# Patient Record
Sex: FEMALE | Race: ASIAN | Employment: FULL TIME | URBAN - METROPOLITAN AREA
[De-identification: names, ages, dates, MRNs, and addresses within clinical notes are randomized per-mention and may not be internally consistent; named-entity substitution may affect disease eponyms.]

---

## 2023-02-08 ENCOUNTER — HOSPITAL ENCOUNTER (EMERGENCY)
Age: 23
Discharge: HOME | End: 2023-02-08

## 2023-02-08 VITALS
RESPIRATION RATE: 16 BRPM | HEIGHT: 64 IN | OXYGEN SATURATION: 99 % | BODY MASS INDEX: 16.22 KG/M2 | DIASTOLIC BLOOD PRESSURE: 81 MMHG | HEART RATE: 92 BPM | SYSTOLIC BLOOD PRESSURE: 114 MMHG | WEIGHT: 95 LBS | TEMPERATURE: 97.8 F

## 2023-02-08 NOTE — ED TRIAGE NOTES
Patient co petechia rash to legs and torso as well as blisters to bottom lip.   Pt was seen 02/03/23

## 2023-02-09 ENCOUNTER — HOSPITAL ENCOUNTER (INPATIENT)
Age: 23
LOS: 3 days | Discharge: HOME | DRG: 813 | End: 2023-02-12
Attending: EMERGENCY MEDICINE | Admitting: FAMILY MEDICINE
Payer: COMMERCIAL

## 2023-02-09 ENCOUNTER — APPOINTMENT (OUTPATIENT)
Dept: GENERAL RADIOLOGY | Age: 23
DRG: 813 | End: 2023-02-09
Payer: COMMERCIAL

## 2023-02-09 DIAGNOSIS — D69.6 THROMBOCYTOPENIA (HCC): Primary | ICD-10-CM

## 2023-02-09 LAB
ABO + RH BLD: NORMAL
ALBUMIN SERPL-MCNC: 3.9 G/DL (ref 3.5–5)
ALBUMIN/GLOB SERPL: 0.8 (ref 0.4–1.6)
ALP SERPL-CCNC: 68 U/L (ref 50–136)
ALT SERPL-CCNC: 23 U/L (ref 12–65)
ANION GAP SERPL CALC-SCNC: 5 MMOL/L (ref 2–11)
AST SERPL-CCNC: 19 U/L (ref 15–37)
BASOPHILS # BLD: 0 K/UL (ref 0–0.2)
BASOPHILS NFR BLD: 0 % (ref 0–2)
BILIRUB SERPL-MCNC: 0.7 MG/DL (ref 0.2–1.1)
BLOOD GROUP ANTIBODIES SERPL: NORMAL
BLOOD GROUP ANTIBODIES SERPL: NORMAL
BUN SERPL-MCNC: 9 MG/DL (ref 6–23)
CALCIUM SERPL-MCNC: 9.3 MG/DL (ref 8.3–10.4)
CHLORIDE SERPL-SCNC: 104 MMOL/L (ref 101–110)
CO2 SERPL-SCNC: 28 MMOL/L (ref 21–32)
CREAT SERPL-MCNC: 0.67 MG/DL (ref 0.6–1)
DAT POLY-SP REAG RBC QL: NORMAL
DIFFERENTIAL METHOD BLD: ABNORMAL
EOSINOPHIL # BLD: 0.1 K/UL (ref 0–0.8)
EOSINOPHIL NFR BLD: 2 % (ref 0.5–7.8)
ERYTHROCYTE [DISTWIDTH] IN BLOOD BY AUTOMATED COUNT: 12.7 % (ref 11.9–14.6)
GLOBULIN SER CALC-MCNC: 4.7 G/DL (ref 2.8–4.5)
GLUCOSE SERPL-MCNC: 98 MG/DL (ref 65–100)
HCT VFR BLD AUTO: 36.6 % (ref 35.8–46.3)
HGB BLD-MCNC: 12.3 G/DL (ref 11.7–15.4)
IMM GRANULOCYTES # BLD AUTO: 0 K/UL (ref 0–0.5)
IMM GRANULOCYTES NFR BLD AUTO: 0 % (ref 0–5)
INR PPP: 0.9
LDH SERPL L TO P-CCNC: 247 U/L (ref 100–190)
LIPASE SERPL-CCNC: 142 U/L (ref 73–393)
LYMPHOCYTES # BLD: 2.3 K/UL (ref 0.5–4.6)
LYMPHOCYTES NFR BLD: 37 % (ref 13–44)
MCH RBC QN AUTO: 27.2 PG (ref 26.1–32.9)
MCHC RBC AUTO-ENTMCNC: 33.6 G/DL (ref 31.4–35)
MCV RBC AUTO: 80.8 FL (ref 82–102)
MONOCYTES # BLD: 0.4 K/UL (ref 0.1–1.3)
MONOCYTES NFR BLD: 7 % (ref 4–12)
NEUTS SEG # BLD: 3.3 K/UL (ref 1.7–8.2)
NEUTS SEG NFR BLD: 54 % (ref 43–78)
NRBC # BLD: 0 K/UL (ref 0–0.2)
PLATELET # BLD AUTO: 2 K/UL (ref 150–450)
PMV BLD AUTO: ABNORMAL FL (ref 9.4–12.3)
POTASSIUM SERPL-SCNC: 3.5 MMOL/L (ref 3.5–5.1)
PROT SERPL-MCNC: 8.6 G/DL (ref 6.3–8.2)
PROTHROMBIN TIME: 12.4 SEC (ref 12.6–14.3)
RBC # BLD AUTO: 4.53 M/UL (ref 4.05–5.2)
SODIUM SERPL-SCNC: 137 MMOL/L (ref 133–143)
SPECIMEN EXP DATE BLD: NORMAL
TROPONIN I SERPL HS-MCNC: 4 PG/ML (ref 0–14)
WBC # BLD AUTO: 6.2 K/UL (ref 4.3–11.1)

## 2023-02-09 PROCEDURE — 80053 COMPREHEN METABOLIC PANEL: CPT

## 2023-02-09 PROCEDURE — 2580000003 HC RX 258: Performed by: FAMILY MEDICINE

## 2023-02-09 PROCEDURE — 74022 RADEX COMPL AQT ABD SERIES: CPT

## 2023-02-09 PROCEDURE — 85025 COMPLETE CBC W/AUTO DIFF WBC: CPT

## 2023-02-09 PROCEDURE — 83615 LACTATE (LD) (LDH) ENZYME: CPT

## 2023-02-09 PROCEDURE — P9035 PLATELET PHERES LEUKOREDUCED: HCPCS

## 2023-02-09 PROCEDURE — 86880 COOMBS TEST DIRECT: CPT

## 2023-02-09 PROCEDURE — 30233R1 TRANSFUSION OF NONAUTOLOGOUS PLATELETS INTO PERIPHERAL VEIN, PERCUTANEOUS APPROACH: ICD-10-PCS | Performed by: FAMILY MEDICINE

## 2023-02-09 PROCEDURE — 1100000000 HC RM PRIVATE

## 2023-02-09 PROCEDURE — 94760 N-INVAS EAR/PLS OXIMETRY 1: CPT

## 2023-02-09 PROCEDURE — 86901 BLOOD TYPING SEROLOGIC RH(D): CPT

## 2023-02-09 PROCEDURE — 83690 ASSAY OF LIPASE: CPT

## 2023-02-09 PROCEDURE — 84484 ASSAY OF TROPONIN QUANT: CPT

## 2023-02-09 PROCEDURE — 86870 RBC ANTIBODY IDENTIFICATION: CPT

## 2023-02-09 PROCEDURE — 85610 PROTHROMBIN TIME: CPT

## 2023-02-09 PROCEDURE — 93005 ELECTROCARDIOGRAM TRACING: CPT | Performed by: STUDENT IN AN ORGANIZED HEALTH CARE EDUCATION/TRAINING PROGRAM

## 2023-02-09 PROCEDURE — 99285 EMERGENCY DEPT VISIT HI MDM: CPT | Performed by: EMERGENCY MEDICINE

## 2023-02-09 RX ORDER — ONDANSETRON 2 MG/ML
4 INJECTION INTRAMUSCULAR; INTRAVENOUS EVERY 6 HOURS PRN
Status: DISCONTINUED | OUTPATIENT
Start: 2023-02-09 | End: 2023-02-12 | Stop reason: HOSPADM

## 2023-02-09 RX ORDER — POLYETHYLENE GLYCOL 3350 17 G/17G
17 POWDER, FOR SOLUTION ORAL DAILY PRN
Status: DISCONTINUED | OUTPATIENT
Start: 2023-02-09 | End: 2023-02-12 | Stop reason: HOSPADM

## 2023-02-09 RX ORDER — SODIUM CHLORIDE 9 MG/ML
INJECTION, SOLUTION INTRAVENOUS PRN
Status: DISCONTINUED | OUTPATIENT
Start: 2023-02-09 | End: 2023-02-12 | Stop reason: HOSPADM

## 2023-02-09 RX ORDER — ACETAMINOPHEN 650 MG/1
650 SUPPOSITORY RECTAL EVERY 6 HOURS PRN
Status: DISCONTINUED | OUTPATIENT
Start: 2023-02-09 | End: 2023-02-12 | Stop reason: HOSPADM

## 2023-02-09 RX ORDER — ACETAMINOPHEN 325 MG/1
650 TABLET ORAL EVERY 6 HOURS PRN
Status: DISCONTINUED | OUTPATIENT
Start: 2023-02-09 | End: 2023-02-12 | Stop reason: HOSPADM

## 2023-02-09 RX ORDER — SODIUM CHLORIDE 0.9 % (FLUSH) 0.9 %
5-40 SYRINGE (ML) INJECTION EVERY 12 HOURS SCHEDULED
Status: DISCONTINUED | OUTPATIENT
Start: 2023-02-09 | End: 2023-02-12 | Stop reason: HOSPADM

## 2023-02-09 RX ORDER — SODIUM CHLORIDE 0.9 % (FLUSH) 0.9 %
5-40 SYRINGE (ML) INJECTION PRN
Status: DISCONTINUED | OUTPATIENT
Start: 2023-02-09 | End: 2023-02-12 | Stop reason: HOSPADM

## 2023-02-09 RX ORDER — ONDANSETRON 4 MG/1
4 TABLET, ORALLY DISINTEGRATING ORAL EVERY 8 HOURS PRN
Status: DISCONTINUED | OUTPATIENT
Start: 2023-02-09 | End: 2023-02-12 | Stop reason: HOSPADM

## 2023-02-09 RX ADMIN — SODIUM CHLORIDE, PRESERVATIVE FREE 10 ML: 5 INJECTION INTRAVENOUS at 23:32

## 2023-02-09 ASSESSMENT — ENCOUNTER SYMPTOMS
RHINORRHEA: 0
PHOTOPHOBIA: 0
CHEST TIGHTNESS: 0
EYE DISCHARGE: 0
APNEA: 0
VOICE CHANGE: 0
SORE THROAT: 0
TROUBLE SWALLOWING: 0
EYE PAIN: 0
FACIAL SWELLING: 0
SHORTNESS OF BREATH: 1
WHEEZING: 0
ABDOMINAL PAIN: 1
EYE REDNESS: 0
COUGH: 0
NAUSEA: 1
DIARRHEA: 1

## 2023-02-09 ASSESSMENT — PAIN SCALES - GENERAL
PAINLEVEL_OUTOF10: 0
PAINLEVEL_OUTOF10: 7

## 2023-02-09 ASSESSMENT — PAIN DESCRIPTION - LOCATION: LOCATION: ABDOMEN

## 2023-02-09 ASSESSMENT — PAIN - FUNCTIONAL ASSESSMENT: PAIN_FUNCTIONAL_ASSESSMENT: 0-10

## 2023-02-09 NOTE — ED PROVIDER NOTES
Emergency Department Provider Note                   PCP:                None None               Age: 23 y.o. Sex: female       ICD-10-CM    1. Thrombocyt[facility] ([facility]  D69.6           DISPOSITION Decision To Admit 02/09/2023 08:10:07 PM  ===================================  ED EKG Interpretation  EKG was interpreted in the absence of a cardiologist.    Rate: 72  EKG Interpretation: EKG Interpretation: sinus rhythm  ST Segments: Normal ST segments - NO STEMI    MARIIA Sanches 8:14 PM         Medical Decision Making  72-year-old female patient who presented today for reevaluation of generalized abdominal pain, nausea, vomiting, diarrhea. New onset of bilateral petechial rash with blistering in the mouth as well as recurrent epistasis. Work-up today was remarkable for platelet count of 2 as well as elevated LDH. Kidney function stable. Borderline febrile. Generalized abdominal tenderness however no focal tenderness. Did not feel that repeat CT imaging was warranted given past negative CT and improvement of symptoms overall. Platelets were ordered for the patient. Differential includes TTP versus ITP versus HUS, etc. consulted hospitalist Dr. Jonathon Judd to get the patient admitted. She is agreeable. Patient history, ROS, physical exam, labs, radiological workup and all pertinent findings were discussed with attending Stefany Poole MD              I have conducted an independent ordering and review of Labs. I have conducted an independent ordering and review of EKG. I have conducted an independent ordering and review of X-rays. I have reviewed records from an external source: ED records from outside this hospital.  Considerations: Shared decision making was utilized in the care of this patient. Considerations: The following labs and/or imaging studies were considered but not ordered: CT abdomen pelvis  Considerations:  The following treatments were considered but not given: Rx such as antibiotics. Considerations: Hospitalization was considered. I discussed study results with another external provider. I discussed disposition with another provider. Patient was admitted I have communication with admitting physician. I have had a discussion with external consultants. ED Course as of 02/09/23 2014 Thu Feb 09, 2023 1940 7:40 PM  Platelet count decreased to 2. Catha Pia my attending physician Dr. Leanna Arzate. We will transfuse platelets. Discussed this with the patient. Consent obtained [NR]   2008 8:08 PM  Consulted Dr. Moreno Talley hospitalist to get patient admitted. [NR]      ED Course User Index  [NR] MARIIA Herrera        Orders Placed This Encounter   Procedures    XR ACUTE ABD SERIES CHEST 1 VW    CBC with Diff    CMP    Lipase    Protime-INR    Lactate Dehydrogenase    Troponin    CBC    Diet NPO    POCT Urine Dipstick    Verify hospital blood product consent form has been signed and witnessed    Vital Signs For Blood Product Transfusion    Transfusion Reaction Management    POC Pregnancy Urine Qual    EKG 12 Lead    TYPE AND SCREEN    PREPARE PLATELETS, 1 Product    Saline lock IV        Medications   0.9 % sodium chloride infusion (has no administration in time range)       New Prescriptions    No medications on file        Javed Ernandez is a 23 y.o. female who presents to the Emergency Department with chief complaint of    Chief Complaint   Patient presents with    Rash    Epistaxis      63-year-old female patient with no significant reported past medical history presents today for reevaluation. She reports for the past week she has been having nausea and vomiting and diarrhea as well as diffuse abdominal pain. Reports has been to both an urgent care and to our ED without clear cause. Suspected possible viral syndrome. She reports 2 days ago she noticed that she had a rash to her bilateral lower extremities that has seemed to have been worsening each day.   She reports it looks like small little spots that are red. She also reports that for the past 2 days she has been waking up with nosebleeds and having nosebleeds throughout the day. She has also noticed blisters appearing on her lips and inside of her mouth. She states she still has the generalized abdominal pain as well as chest pain and shortness of breath but states that it is improving each day. She denies any fevers. Denies dysuria or hematuria. Denies blood in her stool or melena. Denies skin sloughing. Patient is primary historian quality is reliable. The history is provided by the patient. No  was used. Review of Systems   Constitutional:  Negative for fatigue and fever. HENT:  Positive for mouth sores. Negative for congestion, ear pain, facial swelling, hearing loss, rhinorrhea, sore throat, trouble swallowing and voice change. Eyes:  Negative for photophobia, pain, discharge, redness and visual disturbance. Respiratory:  Positive for shortness of breath. Negative for apnea, cough, chest tightness and wheezing. Cardiovascular:  Positive for chest pain. Negative for palpitations. Gastrointestinal:  Positive for abdominal pain, diarrhea and nausea. Endocrine: Negative for polydipsia, polyphagia and polyuria. Genitourinary:  Negative for decreased urine volume, dysuria, flank pain, frequency and hematuria. Musculoskeletal:  Negative for arthralgias, joint swelling, myalgias and neck stiffness. Skin:  Positive for pallor. Negative for rash and wound. Neurological:  Negative for dizziness, syncope, speech difficulty, weakness, light-headedness and headaches. Hematological:  Does not bruise/bleed easily. Psychiatric/Behavioral:  Negative for self-injury and suicidal ideas. All other systems reviewed and are negative. History reviewed. No pertinent past medical history. History reviewed. No pertinent surgical history. History reviewed.  No pertinent family history. Social History     Socioeconomic History    Marital status: Single     Spouse name: None    Number of children: None    Years of education: None    Highest education level: None   Tobacco Use    Smoking status: Never    Smokeless tobacco: Never   Substance and Sexual Activity    Alcohol use: Never    Drug use: Never         Patient has no known allergies. Previous Medications    No medications on file        Vitals signs and nursing note reviewed. Patient Vitals for the past 4 hrs:   Temp Pulse Resp BP SpO2   02/09/23 1802 99.7 °F (37.6 °C) 82 16 121/72 100 %          Physical Exam  Vitals and nursing note reviewed. Constitutional:       General: She is not in acute distress. Appearance: Normal appearance. She is not ill-appearing, toxic-appearing or diaphoretic. HENT:      Head: Normocephalic and atraumatic. Right Ear: External ear normal.      Left Ear: External ear normal.      Nose: Nose normal.      Mouth/Throat:      Mouth: Mucous membranes are moist.      Comments: Multiple blisters in mouth as shown in photos. Eyes:      General: No scleral icterus. Right eye: No discharge. Left eye: No discharge. Conjunctiva/sclera: Conjunctivae normal.   Cardiovascular:      Rate and Rhythm: Normal rate and regular rhythm. Pulses: Normal pulses. Heart sounds: Normal heart sounds. Comments: No chest wall tenderness  Pulmonary:      Effort: Pulmonary effort is normal. No respiratory distress. Breath sounds: Normal breath sounds. No stridor. No wheezing, rhonchi or rales. Abdominal:      General: There is no distension. Palpations: There is no mass. Tenderness: There is abdominal tenderness. There is no right CVA tenderness, left CVA tenderness, guarding or rebound. Hernia: No hernia is present.       Comments: Diffuse generalized abdominal tenderness without focal tenderness or peritoneal signs   Musculoskeletal:         General: Normal range of motion. Cervical back: Normal range of motion and neck supple. Skin:     General: Skin is warm and dry. Capillary Refill: Capillary refill takes less than 2 seconds. Coloration: Skin is not jaundiced. Findings: Rash present. Comments: Petechial rash present to bilateral lower extremities and abdomen as shown in photos   Neurological:      General: No focal deficit present. Mental Status: She is alert and oriented to person, place, and time. Mental status is at baseline. Procedures      Results for orders placed or performed during the hospital encounter of 02/09/23   XR ACUTE ABD SERIES CHEST 1 VW    Narrative    EXAMINATION: Single view chest and three-view abdomen    DATE: 2/9/2023 7:00 PM    COMPARISON: None    CLINICAL HISTORY: Chest and abdominal pain    FINDINGS:    Costophrenic angles are clear. Heart size is normal.  Pulmonary vasculature is not distended. There are no dense regions of   consolidation. No free air identified in the abdomen. No dilated loops of large and small   bowel. Moderate amount retained stool within the descending and distal colon. Impression    No acute cardiopulmonary process. Moderate amount of retained stool in the colon. Nonobstructive bowel gas pattern    Elian Solomon M.D.   2/9/2023 7:35:00 PM   CBC with Diff   Result Value Ref Range    WBC 6.2 4.3 - 11.1 K/uL    RBC 4.53 4.05 - 5.2 M/uL    Hemoglobin 12.3 11.7 - 15.4 g/dL    Hematocrit 36.6 35.8 - 46.3 %    MCV 80.8 (L) 82.0 - 102.0 FL    MCH 27.2 26.1 - 32.9 PG    MCHC 33.6 31.4 - 35.0 g/dL    RDW 12.7 11.9 - 14.6 %    Platelets 2 (LL) 714 - 450 K/uL    MPV Unable to calculate. Recommend adding IPF.  9.4 - 12.3 FL    nRBC 0.00 0.0 - 0.2 K/uL    Differential Type AUTOMATED      Seg Neutrophils 54 43 - 78 %    Lymphocytes 37 13 - 44 %    Monocytes 7 4.0 - 12.0 %    Eosinophils % 2 0.5 - 7.8 %    Basophils 0 0.0 - 2.0 %    Immature Granulocytes 0 0.0 - 5.0 %    Segs Absolute 3.3 1.7 - 8.2 K/UL    Absolute Lymph # 2.3 0.5 - 4.6 K/UL    Absolute Mono # 0.4 0.1 - 1.3 K/UL    Absolute Eos # 0.1 0.0 - 0.8 K/UL    Basophils Absolute 0.0 0.0 - 0.2 K/UL    Absolute Immature Granulocyte 0.0 0.0 - 0.5 K/UL   CMP   Result Value Ref Range    Sodium 137 133 - 143 mmol/L    Potassium 3.5 3.5 - 5.1 mmol/L    Chloride 104 101 - 110 mmol/L    CO2 28 21 - 32 mmol/L    Anion Gap 5 2 - 11 mmol/L    Glucose 98 65 - 100 mg/dL    BUN 9 6 - 23 MG/DL    Creatinine 0.67 0.6 - 1.0 MG/DL    Est, Glom Filt Rate >60 >60 ml/min/1.73m2    Calcium 9.3 8.3 - 10.4 MG/DL    Total Bilirubin 0.7 0.2 - 1.1 MG/DL    ALT 23 12 - 65 U/L    AST 19 15 - 37 U/L    Alk Phosphatase 68 50 - 136 U/L    Total Protein 8.6 (H) 6.3 - 8.2 g/dL    Albumin 3.9 3.5 - 5.0 g/dL    Globulin 4.7 (H) 2.8 - 4.5 g/dL    Albumin/Globulin Ratio 0.8 0.4 - 1.6     Lipase   Result Value Ref Range    Lipase 142 73 - 393 U/L   Protime-INR   Result Value Ref Range    Protime 12.4 (L) 12.6 - 14.3 sec    INR 0.9     Lactate Dehydrogenase   Result Value Ref Range     (H) 100 - 190 U/L   Troponin   Result Value Ref Range    Troponin, High Sensitivity 4.0 0 - 14 pg/mL   TYPE AND SCREEN   Result Value Ref Range    Crossmatch expiration date 02/12/2023,2359     ABO/Rh B POSITIVE     Antibody Screen PENDING         XR ACUTE ABD SERIES CHEST 1 VW   Final Result      No acute cardiopulmonary process. Moderate amount of retained stool in the colon. Nonobstructive bowel gas pattern      Elian Gupta M.D.    2/9/2023 7:35:00 PM                          Voice dictation software was used during the making of this note. This software is not perfect and grammatical and other typographical errors may be present. This note has not been completely proofread for errors.      Evelio Mattama  02/09/23 2014

## 2023-02-09 NOTE — ED TRIAGE NOTES
Patient reports rash to body and legs, bleeding gums and epistaxis. Patient reports abd pain, but states that is improving.

## 2023-02-10 PROBLEM — D61.818 PANCYTOPENIA (HCC): Status: ACTIVE | Noted: 2023-02-10

## 2023-02-10 PROBLEM — R04.0 EPISTAXIS: Status: ACTIVE | Noted: 2023-02-10

## 2023-02-10 LAB
ANION GAP SERPL CALC-SCNC: 4 MMOL/L (ref 2–11)
APTT PPP: 75 SEC (ref 24.5–34.2)
BASOPHILS # BLD: 0 K/UL (ref 0–0.2)
BASOPHILS NFR BLD: 0 % (ref 0–2)
BLD PROD TYP BPU: NORMAL
BLOOD BANK DISPENSE STATUS: NORMAL
BPU ID: NORMAL
BUN SERPL-MCNC: 9 MG/DL (ref 6–23)
CALCIUM SERPL-MCNC: 9.1 MG/DL (ref 8.3–10.4)
CHLORIDE SERPL-SCNC: 110 MMOL/L (ref 101–110)
CO2 SERPL-SCNC: 26 MMOL/L (ref 21–32)
CREAT SERPL-MCNC: 0.62 MG/DL (ref 0.6–1)
D DIMER PPP FEU-MCNC: 1.09 UG/ML(FEU)
DIFFERENTIAL METHOD BLD: ABNORMAL
EKG ATRIAL RATE: 72 BPM
EKG DIAGNOSIS: NORMAL
EKG P AXIS: 55 DEGREES
EKG P-R INTERVAL: 156 MS
EKG Q-T INTERVAL: 423 MS
EKG QRS DURATION: 72 MS
EKG QTC CALCULATION (BAZETT): 463 MS
EKG R AXIS: 79 DEGREES
EKG T AXIS: 56 DEGREES
EKG VENTRICULAR RATE: 72 BPM
EOSINOPHIL # BLD: 0.1 K/UL (ref 0–0.8)
EOSINOPHIL NFR BLD: 3 % (ref 0.5–7.8)
ERYTHROCYTE [DISTWIDTH] IN BLOOD BY AUTOMATED COUNT: 12.6 % (ref 11.9–14.6)
ERYTHROCYTE [DISTWIDTH] IN BLOOD BY AUTOMATED COUNT: 12.6 % (ref 11.9–14.6)
ERYTHROCYTE [DISTWIDTH] IN BLOOD BY AUTOMATED COUNT: 12.7 % (ref 11.9–14.6)
FERRITIN SERPL-MCNC: 29 NG/ML (ref 8–388)
FIBRINOGEN PPP-MCNC: 294 MG/DL (ref 190–501)
FOLATE SERPL-MCNC: 11.6 NG/ML (ref 3.1–17.5)
GLUCOSE SERPL-MCNC: 101 MG/DL (ref 65–100)
HAPTOGLOB SERPL-MCNC: 135 MG/DL (ref 30–200)
HCT VFR BLD AUTO: 34.2 % (ref 35.8–46.3)
HCT VFR BLD AUTO: 34.2 % (ref 35.8–46.3)
HCT VFR BLD AUTO: 34.9 % (ref 35.8–46.3)
HGB BLD-MCNC: 11.5 G/DL (ref 11.7–15.4)
HGB BLD-MCNC: 11.5 G/DL (ref 11.7–15.4)
HGB BLD-MCNC: 11.6 G/DL (ref 11.7–15.4)
HGB RETIC QN AUTO: 33 PG (ref 29–35)
IMM GRANULOCYTES # BLD AUTO: 0 K/UL (ref 0–0.5)
IMM GRANULOCYTES NFR BLD AUTO: 0 % (ref 0–5)
IMM RETICS NFR: 5.6 % (ref 3–15.9)
INR PPP: 1
IRON SATN MFR SERPL: 15 %
IRON SERPL-MCNC: 50 UG/DL (ref 35–150)
LYMPHOCYTES # BLD: 1.4 K/UL (ref 0.5–4.6)
LYMPHOCYTES NFR BLD: 31 % (ref 13–44)
MCH RBC QN AUTO: 26.5 PG (ref 26.1–32.9)
MCH RBC QN AUTO: 26.9 PG (ref 26.1–32.9)
MCH RBC QN AUTO: 27.1 PG (ref 26.1–32.9)
MCHC RBC AUTO-ENTMCNC: 33.2 G/DL (ref 31.4–35)
MCHC RBC AUTO-ENTMCNC: 33.6 G/DL (ref 31.4–35)
MCHC RBC AUTO-ENTMCNC: 33.6 G/DL (ref 31.4–35)
MCV RBC AUTO: 79.9 FL (ref 82–102)
MCV RBC AUTO: 79.9 FL (ref 82–102)
MCV RBC AUTO: 80.5 FL (ref 82–102)
MONOCYTES # BLD: 0.2 K/UL (ref 0.1–1.3)
MONOCYTES NFR BLD: 4 % (ref 4–12)
NEUTS SEG # BLD: 2.8 K/UL (ref 1.7–8.2)
NEUTS SEG NFR BLD: 62 % (ref 43–78)
NRBC # BLD: 0 K/UL (ref 0–0.2)
PATH REV BLD -IMP: NORMAL
PLATELET # BLD AUTO: 2 K/UL (ref 150–450)
PLATELET # BLD AUTO: 24 K/UL (ref 150–450)
PLATELET # BLD AUTO: 38 K/UL (ref 150–450)
PMV BLD AUTO: 9.6 FL (ref 9.4–12.3)
PMV BLD AUTO: ABNORMAL FL (ref 9.4–12.3)
PMV BLD AUTO: ABNORMAL FL (ref 9.4–12.3)
POTASSIUM SERPL-SCNC: 4 MMOL/L (ref 3.5–5.1)
PROTHROMBIN TIME: 13.4 SEC (ref 12.6–14.3)
RBC # BLD AUTO: 4.25 M/UL (ref 4.05–5.2)
RBC # BLD AUTO: 4.28 M/UL (ref 4.05–5.2)
RBC # BLD AUTO: 4.37 M/UL (ref 4.05–5.2)
RETICS # AUTO: 0.04 M/UL (ref 0.03–0.1)
RETICS/RBC NFR AUTO: 0.9 % (ref 0.3–2)
SODIUM SERPL-SCNC: 140 MMOL/L (ref 133–143)
TIBC SERPL-MCNC: 329 UG/DL (ref 250–450)
UNIT DIVISION: 0
VIT B12 SERPL-MCNC: 519 PG/ML (ref 193–986)
WBC # BLD AUTO: 3.9 K/UL (ref 4.3–11.1)
WBC # BLD AUTO: 4.5 K/UL (ref 4.3–11.1)
WBC # BLD AUTO: 4.9 K/UL (ref 4.3–11.1)

## 2023-02-10 PROCEDURE — 36415 COLL VENOUS BLD VENIPUNCTURE: CPT

## 2023-02-10 PROCEDURE — 2580000003 HC RX 258: Performed by: NURSE PRACTITIONER

## 2023-02-10 PROCEDURE — 85730 THROMBOPLASTIN TIME PARTIAL: CPT

## 2023-02-10 PROCEDURE — 6370000000 HC RX 637 (ALT 250 FOR IP): Performed by: INTERNAL MEDICINE

## 2023-02-10 PROCEDURE — 85379 FIBRIN DEGRADATION QUANT: CPT

## 2023-02-10 PROCEDURE — 82784 ASSAY IGA/IGD/IGG/IGM EACH: CPT

## 2023-02-10 PROCEDURE — 1100000000 HC RM PRIVATE

## 2023-02-10 PROCEDURE — 83010 ASSAY OF HAPTOGLOBIN QUANT: CPT

## 2023-02-10 PROCEDURE — 85384 FIBRINOGEN ACTIVITY: CPT

## 2023-02-10 PROCEDURE — 6370000000 HC RX 637 (ALT 250 FOR IP): Performed by: FAMILY MEDICINE

## 2023-02-10 PROCEDURE — 85025 COMPLETE CBC W/AUTO DIFF WBC: CPT

## 2023-02-10 PROCEDURE — 36430 TRANSFUSION BLD/BLD COMPNT: CPT

## 2023-02-10 PROCEDURE — 6360000002 HC RX W HCPCS: Performed by: NURSE PRACTITIONER

## 2023-02-10 PROCEDURE — 82728 ASSAY OF FERRITIN: CPT

## 2023-02-10 PROCEDURE — 82746 ASSAY OF FOLIC ACID SERUM: CPT

## 2023-02-10 PROCEDURE — 85027 COMPLETE CBC AUTOMATED: CPT

## 2023-02-10 PROCEDURE — 85610 PROTHROMBIN TIME: CPT

## 2023-02-10 PROCEDURE — 85046 RETICYTE/HGB CONCENTRATE: CPT

## 2023-02-10 PROCEDURE — 80048 BASIC METABOLIC PNL TOTAL CA: CPT

## 2023-02-10 PROCEDURE — 82607 VITAMIN B-12: CPT

## 2023-02-10 PROCEDURE — 99222 1ST HOSP IP/OBS MODERATE 55: CPT | Performed by: INTERNAL MEDICINE

## 2023-02-10 PROCEDURE — 2580000003 HC RX 258: Performed by: FAMILY MEDICINE

## 2023-02-10 PROCEDURE — APPNB30 APP NON BILLABLE TIME 0-30 MINS: Performed by: NURSE PRACTITIONER

## 2023-02-10 PROCEDURE — 83550 IRON BINDING TEST: CPT

## 2023-02-10 RX ORDER — SODIUM CHLORIDE 9 MG/ML
INJECTION, SOLUTION INTRAVENOUS PRN
Status: DISCONTINUED | OUTPATIENT
Start: 2023-02-10 | End: 2023-02-12 | Stop reason: HOSPADM

## 2023-02-10 RX ORDER — POLYETHYLENE GLYCOL 3350 17 G/17G
17 POWDER, FOR SOLUTION ORAL DAILY
Status: DISCONTINUED | OUTPATIENT
Start: 2023-02-10 | End: 2023-02-12 | Stop reason: HOSPADM

## 2023-02-10 RX ORDER — DOCUSATE SODIUM 100 MG/1
100 CAPSULE, LIQUID FILLED ORAL 2 TIMES DAILY
Status: DISCONTINUED | OUTPATIENT
Start: 2023-02-10 | End: 2023-02-12 | Stop reason: HOSPADM

## 2023-02-10 RX ORDER — PANTOPRAZOLE SODIUM 40 MG/1
40 TABLET, DELAYED RELEASE ORAL
Status: DISCONTINUED | OUTPATIENT
Start: 2023-02-10 | End: 2023-02-12 | Stop reason: HOSPADM

## 2023-02-10 RX ORDER — PREDNISONE 50 MG/1
50 TABLET ORAL DAILY
Status: DISCONTINUED | OUTPATIENT
Start: 2023-02-10 | End: 2023-02-12 | Stop reason: HOSPADM

## 2023-02-10 RX ADMIN — ACETAMINOPHEN 650 MG: 325 TABLET, FILM COATED ORAL at 20:54

## 2023-02-10 RX ADMIN — PREDNISONE 50 MG: 50 TABLET ORAL at 22:27

## 2023-02-10 RX ADMIN — PANTOPRAZOLE SODIUM 40 MG: 40 TABLET, DELAYED RELEASE ORAL at 16:14

## 2023-02-10 RX ADMIN — SODIUM CHLORIDE 250 MG: 9 INJECTION, SOLUTION INTRAVENOUS at 16:15

## 2023-02-10 RX ADMIN — SODIUM CHLORIDE, PRESERVATIVE FREE 10 ML: 5 INJECTION INTRAVENOUS at 20:56

## 2023-02-10 RX ADMIN — SODIUM CHLORIDE, PRESERVATIVE FREE 10 ML: 5 INJECTION INTRAVENOUS at 09:13

## 2023-02-10 RX ADMIN — DOCUSATE SODIUM 100 MG: 100 CAPSULE ORAL at 20:54

## 2023-02-10 RX ADMIN — POLYETHYLENE GLYCOL 3350 17 G: 17 POWDER, FOR SOLUTION ORAL at 16:14

## 2023-02-10 RX ADMIN — ACETAMINOPHEN 650 MG: 325 TABLET, FILM COATED ORAL at 01:37

## 2023-02-10 RX ADMIN — IMMUNE GLOBULIN (HUMAN) 40 G: 10 INJECTION INTRAVENOUS; SUBCUTANEOUS at 18:30

## 2023-02-10 ASSESSMENT — PAIN DESCRIPTION - DESCRIPTORS: DESCRIPTORS: ACHING

## 2023-02-10 ASSESSMENT — PAIN SCALES - GENERAL
PAINLEVEL_OUTOF10: 3
PAINLEVEL_OUTOF10: 3
PAINLEVEL_OUTOF10: 2

## 2023-02-10 ASSESSMENT — PAIN DESCRIPTION - LOCATION
LOCATION: SHOULDER
LOCATION: HEAD

## 2023-02-10 NOTE — ASSESSMENT & PLAN NOTE
- Possibly due to recent viral gastroenteritis  - No other known medical history  - Petechiae covering BLE and oral palate as well  - No active bleeding  - Hgb 12.3 currently  - No anticoagulation  - Transfusing platelets now  - Will trend plts to keep above at least 20,000 as patient is not actively bleeding.   If patient starts to bleed, will need to keep at least >50,000  - Consult to Oncology

## 2023-02-10 NOTE — CARE COORDINATION
02/10/23 1145   Service Assessment   Patient Orientation Alert and Oriented   Cognition Alert   History Provided By Patient   Primary Caregiver Self   Support Systems Family Members   PCP Verified by CM Yes   Prior Functional Level Independent in ADLs/IADLs   Current Functional Level Independent in ADLs/IADLs   Can patient return to prior living arrangement Yes   Ability to make needs known: Good   Family able to assist with home care needs: Yes   Would you like for me to discuss the discharge plan with any other family members/significant others, and if so, who? Yes   Financial Resources Other (Comment)  (BCBS)   Community Resources None   Social/Functional History   Lives With Family; Other (comment)  (Mom, sister)   Type of [address] Equipment None   Home Equipment None   ADL Assistance Independent   Mode of Transportation Car   Discharge Planning   Type of Residence House   Living Arrangements Family Members   Current Services Prior To Admission None   Potential Assistance Needed N/A   DME Ordered? No   Type of Home Care Services None   [facility]   Transition of Care Consult (CM Consult) N/A   Services At/After Discharge None     RN CM met with the patient at the bedside and discussed discharge planning. The patient lives with family and is independent of ADLs. She does not use DMEs or external services. Her family will transport her home at discharge. She confirmed she has an appointment with a new PCP but her appointment isn't until May. According to the patient, her new PCP is Whitney Rios MD. The patient's appointment was discussed with the nursing staff. RN CM encouraged the patient to ask questions. Case Management will continue to follow this patient for discharge planning needs.

## 2023-02-10 NOTE — ED NOTES
Lab notified this RN platelet prep delayed d/t unavailability of B+ platelets at his facility. PA notified.      Rohan Gates RN  02/09/23 2016

## 2023-02-10 NOTE — MANAGEMENT PLAN
[facility] Hematology & Oncology        Inpatient Hematology / Oncology Abstract    Reason for Consult: Thrombocytopenia [facility]) [D69.6]  Referring Physician:  Dylan North DO    History of Present Illness:  Ms. Jones Salguero is a 23 y.o. female admitted on 2/9/2023. The encounter diagnosis was Thrombocytopenia ([address] .      She has no PMH. She presented to ED with c/o epistaxis and rash. Patient reports that over the past 2 days, she has developed \"rashes\" on both legs. She has also been having intermittent nose bleeds as well. Of note, she was recently seen in the ER on 2/4/23 with abdominal pain and diarrhea, diagnosed with a viral gastroenteritis at that time which did not require admission. She reports some residual abdominal pain, but that has been improving over the past week. ED w/u revealed WBC 6.2 / ANC 3.3, Hgb 12.3 (down to 11.5 today) / Plt 2k. CT AP from 2/4 with diarrhea. KUB with mod amount of stool retained in the colon with nonobstructive bowel gas pattern. Pt is Fe deficient with Tsat 15% and ferritin 29. No B12 or folate deficiencies noted. PTT elevated at 75, INR/PT/Fibrinogen wnl. SPEP pending. We were consulted for thrombocytopenia. No Known Allergies  History reviewed. No pertinent past medical history. History reviewed. No pertinent surgical history. History reviewed. No pertinent family history.   Social History     Socioeconomic History    Marital status: Single     Spouse name: Not on file    Number of children: Not on file    Years of education: Not on file    Highest education level: Not on file   Occupational History    Not on file   Tobacco Use    Smoking status: Never    Smokeless tobacco: Never   Substance and Sexual Activity    Alcohol use: Never    Drug use: Never    Sexual activity: Not on file   Other Topics Concern    Not on file   Social History Narrative    Not on file     Social Determinants of Health     Financial Resource Strain: Not on file   Food Insecurity: Not on file   Transportation Needs: Not on file   Physical Activity: Not on file   Stress: Not on file   Social Connections: Not on file   Intimate Partner Violence: Not on file   Housing Stability: Not on file     Current Facility-Administered Medications   Medication Dose Route Frequency Provider Last Rate Last Admin    0.9 % sodium chloride infusion   IntraVENous PRN Vicente Hernandez MD        ferric gluconate (FERRLECIT) 250 mg in sodium chloride 0.9 % 250 mL IVPB  250 mg IntraVENous Once HEIKE Wayne CNP        immune globulin (GAMUNEX-C) 10% solution 45 g  1 g/kg IntraVENous Once HEIKE Wayne CNP        0.9 % sodium chloride infusion   IntraVENous PRN MARIIA Chatman        sodium chloride flush 0.9 % injection 5-40 mL  5-40 mL IntraVENous 2 times per day Vicente Hernandez MD   10 mL at 02/10/23 0913    sodium chloride flush 0.9 % injection 5-40 mL  5-40 mL IntraVENous PRN Vicente Hernandez MD        0.9 % sodium chloride infusion   IntraVENous PRN Vicente Hernandez MD        ondansetron (ZOFRAN-ODT) disintegrating tablet 4 mg  4 mg Oral Q8H PRN Vicente Hernandez MD        Or    ondansetron [facility]) injection 4 mg  4 mg IntraVENous Q6H PRN Vicente Hernandez MD        polyethylene glycol (GLYCOLAX) packet 17 g  17 g Oral Daily PRN Vicente Hernandez MD        acetaminophen (TYLENOL) tablet 650 mg  650 mg Oral Q6H PRN Vicente Hernandez MD   650 mg at 02/10/23 6003    Or    acetaminophen (TYLENOL) suppository 650 mg  650 mg Rectal Q6H PRN Vicente Hernandez MD           OBJECTIVE:  Patient Vitals for the past 8 hrs:   BP Temp Temp src Pulse Resp SpO2   02/10/23 1114 113/77 98.6 °F (37 °C) Oral 61 17 98 %   02/10/23 1022 110/76 98.2 °F (36.8 °C) Oral 69 20 99 %   02/10/23 0953 110/73 98.4 °F (36.9 °C) -- 82 18 98 %   02/10/23 0731 118/72 98.4 °F (36.9 °C) Oral 67 18 95 %     Temp (24hrs), Av.6 °F (37 °C), Min:98.2 °F (36.8 °C), Max:99.7 °F (37.6 °C)    02/10 0701 - 02/10 1900  In: 884 [P.O.:300]  Out: -     Labs:    Recent Results (from the past 24 hour(s))   Protime-INR    Collection Time: 02/09/23  7:02 PM   Result Value Ref Range    Protime 12.4 (L) 12.6 - 14.3 sec    INR 0.9     CBC with Diff    Collection Time: 02/09/23  7:03 PM   Result Value Ref Range    WBC 6.2 4.3 - 11.1 K/uL    RBC 4.53 4.05 - 5.2 M/uL    Hemoglobin 12.3 11.7 - 15.4 g/dL    Hematocrit 36.6 35.8 - 46.3 %    MCV 80.8 (L) 82.0 - 102.0 FL    MCH 27.2 26.1 - 32.9 PG    MCHC 33.6 31.4 - 35.0 g/dL    RDW 12.7 11.9 - 14.6 %    Platelets 2 (LL) 748 - 450 K/uL    MPV Unable to calculate. Recommend adding IPF.  9.4 - 12.3 FL    nRBC 0.00 0.0 - 0.2 K/uL    Differential Type AUTOMATED      Seg Neutrophils 54 43 - 78 %    Lymphocytes 37 13 - 44 %    Monocytes 7 4.0 - 12.0 %    Eosinophils % 2 0.5 - 7.8 %    Basophils 0 0.0 - 2.0 %    Immature Granulocytes 0 0.0 - 5.0 %    Segs Absolute 3.3 1.7 - 8.2 K/UL    Absolute Lymph # 2.3 0.5 - 4.6 K/UL    Absolute Mono # 0.4 0.1 - 1.3 K/UL    Absolute Eos # 0.1 0.0 - 0.8 K/UL    Basophils Absolute 0.0 0.0 - 0.2 K/UL    Absolute Immature Granulocyte 0.0 0.0 - 0.5 K/UL   CMP    Collection Time: 02/09/23  7:03 PM   Result Value Ref Range    Sodium 137 133 - 143 mmol/L    Potassium 3.5 3.5 - 5.1 mmol/L    Chloride 104 101 - 110 mmol/L    CO2 28 21 - 32 mmol/L    Anion Gap 5 2 - 11 mmol/L    Glucose 98 65 - 100 mg/dL    BUN 9 6 - 23 MG/DL    Creatinine 0.67 0.6 - 1.0 MG/DL    Est, Glom Filt Rate >60 >60 ml/min/1.73m2    Calcium 9.3 8.3 - 10.4 MG/DL    Total Bilirubin 0.7 0.2 - 1.1 MG/DL    ALT 23 12 - 65 U/L    AST 19 15 - 37 U/L    Alk Phosphatase 68 50 - 136 U/L    Total Protein 8.6 (H) 6.3 - 8.2 g/dL    Albumin 3.9 3.5 - 5.0 g/dL    Globulin 4.7 (H) 2.8 - 4.5 g/dL    Albumin/Globulin Ratio 0.8 0.4 - 1.6     Lipase    Collection Time: 02/09/23  7:03 PM   Result Value Ref Range    Lipase 142 73 - 393 U/L   TYPE AND SCREEN    Collection Time: 02/09/23  7:03 PM   Result Value Ref Range    Crossmatch expiration date 02/12/2023,2926     ABO/Rh B POSITIVE     Antibody Screen POS     Antibody Ident UNIDENTIFIED ANTIBODY     Polyspecific Marian NEG    Lactate Dehydrogenase    Collection Time: 02/09/23  7:03 PM   Result Value Ref Range     (H) 100 - 190 U/L   Troponin    Collection Time: 02/09/23  7:03 PM   Result Value Ref Range    Troponin, High Sensitivity 4.0 0 - 14 pg/mL   EKG 12 Lead    Collection Time: 02/09/23  7:14 PM   Result Value Ref Range    Ventricular Rate 72 BPM    Atrial Rate 72 BPM    P-R Interval 156 ms    QRS Duration 72 ms    Q-T Interval 423 ms    QTc Calculation (Bazett) 463 ms    P Axis 55 degrees    R Axis 79 degrees    T Axis 56 degrees    Diagnosis Sinus rhythm    PREPARE PLATELETS, 1 Product    Collection Time: 02/09/23  7:45 PM   Result Value Ref Range    Unit Number [id number]     Product Code Blood Bank [facility],LR1     Unit Divison 00     Dispense Status Blood Bank TRANSFUSED    CBC    Collection Time: 02/10/23 12:08 AM   Result Value Ref Range    WBC 4.9 4.3 - 11.1 K/uL    RBC 4.28 4.05 - 5.2 M/uL    Hemoglobin 11.5 (L) 11.7 - 15.4 g/dL    Hematocrit 34.2 (L) 35.8 - 46.3 %    MCV 79.9 (L) 82.0 - 102.0 FL    MCH 26.9 26.1 - 32.9 PG    MCHC 33.6 31.4 - 35.0 g/dL    RDW 12.6 11.9 - 14.6 %    Platelets 24 (LL) 587 - 450 K/uL    MPV Unable to calculate. Recommend adding IPF.  9.4 - 12.3 FL    nRBC 0.00 0.0 - 0.2 K/uL   Basic Metabolic Panel w/ Reflex to MG    Collection Time: 02/10/23  6:06 AM   Result Value Ref Range    Sodium 140 133 - 143 mmol/L    Potassium 4.0 3.5 - 5.1 mmol/L    Chloride 110 101 - 110 mmol/L    CO2 26 21 - 32 mmol/L    Anion Gap 4 2 - 11 mmol/L    Glucose 101 (H) 65 - 100 mg/dL    BUN 9 6 - 23 MG/DL    Creatinine 0.62 0.6 - 1.0 MG/DL    Est, Glom Filt Rate >60 >60 ml/min/1.73m2    Calcium 9.1 8.3 - 10.4 MG/DL   CBC with Auto Differential    Collection Time: 02/10/23  6:06 AM   Result Value Ref Range    WBC 4.5 4.3 - 11.1 K/uL    RBC 4.37 4.05 - 5.2 M/uL Hemoglobin 11.6 (L) 11.7 - 15.4 g/dL    Hematocrit 34.9 (L) 35.8 - 46.3 %    MCV 79.9 (L) 82.0 - 102.0 FL    MCH 26.5 26.1 - 32.9 PG    MCHC 33.2 31.4 - 35.0 g/dL    RDW 12.6 11.9 - 14.6 %    Platelets 2 (LL) 811 - 450 K/uL    MPV Unable to calculate. Recommend adding IPF.  9.4 - 12.3 FL    nRBC 0.00 0.0 - 0.2 K/uL    Differential Type AUTOMATED      Seg Neutrophils 62 43 - 78 %    Lymphocytes 31 13 - 44 %    Monocytes 4 4.0 - 12.0 %    Eosinophils % 3 0.5 - 7.8 %    Basophils 0 0.0 - 2.0 %    Immature Granulocytes 0 0.0 - 5.0 %    Segs Absolute 2.8 1.7 - 8.2 K/UL    Absolute Lymph # 1.4 0.5 - 4.6 K/UL    Absolute Mono # 0.2 0.1 - 1.3 K/UL    Absolute Eos # 0.1 0.0 - 0.8 K/UL    Basophils Absolute 0.0 0.0 - 0.2 K/UL    Absolute Immature Granulocyte 0.0 0.0 - 0.5 K/UL   PREPARE PLATELETS, 1 Product    Collection Time: 02/10/23  6:30 AM   Result Value Ref Range    Unit Number [id number]     Product Code Blood Bank RANDOM PLATELETS     Unit Divison 00     Dispense Status Blood Bank ISSUED    Fibrinogen    Collection Time: 02/10/23  7:28 AM   Result Value Ref Range    Fibrinogen 294 190 - 501 mg/dL   APTT    Collection Time: 02/10/23  7:28 AM   Result Value Ref Range    PTT 75.0 (H) 24.5 - 34.2 SEC   Protime-INR    Collection Time: 02/10/23  7:28 AM   Result Value Ref Range    Protime 13.4 12.6 - 14.3 sec    INR 1.0     D-Dimer, Quantitative    Collection Time: 02/10/23  7:28 AM   Result Value Ref Range    D-Dimer, Quant 1.09 (H) <0.56 ug/ml(FEU)   Reticulocytes    Collection Time: 02/10/23  7:28 AM   Result Value Ref Range    Reticulocyte Count,Automated 0.9 0.3 - 2.0 %    Absolute Retic # 0.0401 0.026 - 0.095 M/ul    Immature Retic Fraction 5.6 3.0 - 15.9 %    Retic Hemoglobin conc. 33 29 - 35 pg   Transferrin Saturation    Collection Time: 02/10/23  7:28 AM   Result Value Ref Range    Iron 50 35 - 150 ug/dL    TIBC 329 250 - 450 ug/dL    TRANSFERRIN SATURATION 15 %   Ferritin    Collection Time: 02/10/23  7:28 AM Result Value Ref Range    Ferritin 29 8 - 388 NG/ML   Folate    Collection Time: 02/10/23  7:28 AM   Result Value Ref Range    Folate 11.6 3.1 - 17.5 ng/mL   Vitamin B12    Collection Time: 02/10/23  7:28 AM   Result Value Ref Range    Vitamin B-12 519 193 - 986 pg/mL   CBC    Collection Time: 02/10/23  1:25 PM   Result Value Ref Range    WBC 3.9 (L) 4.3 - 11.1 K/uL    RBC 4.25 4.05 - 5.2 M/uL    Hemoglobin 11.5 (L) 11.7 - 15.4 g/dL    Hematocrit 34.2 (L) 35.8 - 46.3 %    MCV 80.5 (L) 82.0 - 102.0 FL    MCH 27.1 26.1 - 32.9 PG    MCHC 33.6 31.4 - 35.0 g/dL    RDW 12.7 11.9 - 14.6 %    Platelets 38 (L) 540 - 450 K/uL    MPV 9.6 9.4 - 12.3 FL    nRBC 0.00 0.0 - 0.2 K/uL       Imaging:  Xray Result (most recent):  XR ACUTE ABD SERIES CHEST 1 VW 02/09/2023    Narrative  EXAMINATION: Single view chest and three-view abdomen    DATE: 2/9/2023 7:00 PM    COMPARISON: None    CLINICAL HISTORY: Chest and abdominal pain    FINDINGS:    Costophrenic angles are clear. Heart size is normal.  Pulmonary vasculature is not distended. There are no dense regions of  consolidation. No free air identified in the abdomen. No dilated loops of large and small  bowel. Moderate amount retained stool within the descending and distal colon. Impression  No acute cardiopulmonary process. Moderate amount of retained stool in the colon. Nonobstructive bowel gas pattern    Slot  . Roberto Gang M.DJung  2/9/2023 7:35:00 PM    CT Result (most recent):  CT ABDOMEN PELVIS W IV CONTRAST 02/04/2023    Narrative  EXAM: CT ABDOMEN PELVIS W IV CONTRAST    HISTORY: abd pain. TECHNIQUE: Axial images of the abdomen and pelvis were performed following the  administration of intravenous contrast. Images were obtained in the axial plane  and coronal reformatted images were created and reviewed. Dose reduction technique used:  Automated exposure control/Adjustment of the mA  and/or kV according to patient size/Use of iterative reconstruction technique. 100 mL of Isovue-370 were utilized. COMPARISON: None. FINDINGS:  The visualized lung bases and mediastinum are unremarkable. The liver, spleen, pancreas, adrenal glands, gallbladder, are within normal  limits. The bladder appears grossly normal.    The kidneys show symmetric enhancement. Hyperdensity noted throughout the  pyramids suggests early excretory phase. Pelvic reproductive organs are seen and appear grossly normal.    Distal esophagus and stomach are unremarkable. Small and large bowel show no  evidence of obstruction. There is a normal appendix in the right lower quadrant. The entire colon and rectum is filled with low-density fluid. No evidence of free fluid or free air. No pathologic adenopathy. No abdominal  aortic aneurysm. Osseous structures show no evidence of acute fracture or suspicious lesion. Impression  The entire colon and rectum is filled with low density fluid. This can be seen  with diarrhea. Otherwise grossly unremarkable examination of the abdomen and pelvis. The kidneys show symmetric enhancement. Hyperdensity noted throughout the  pyramids suggests imaging during the early excretory phase. ASSESSMENT:  Patient Active Problem List   Diagnosis    Thrombocytopenia ([address]    Pancytopenia ([address]     Ms. Andrey Grigsby is a 23 y.o. female admitted on 2/9/2023. The encounter diagnosis was Thrombocytopenia ([address] .      She has no PMH. She presented to ED with c/o epistaxis and rash. Patient reports that over the past 2 days, she has developed \"rashes\" on both legs. She has also been having intermittent nose bleeds as well. Of note, she was recently seen in the ER on 2/4/23 with abdominal pain and diarrhea, diagnosed with a viral gastroenteritis at that time which did not require admission. She reports some residual abdominal pain, but that has been improving over the past week. ED w/u revealed WBC 6.2 / ANC 3.3, Hgb 12.3 (down to 11.5 today) / Plt 2k. CT AP from 2/4 with diarrhea. KUB with mod amount of stool retained in the colon with nonobstructive bowel gas pattern. Pt is Fe deficient with Tsat 15% and ferritin 29. No B12 or folate deficiencies noted. PTT elevated at 75, INR/PT/Fibrinogen wnl. SPEP pending. We were consulted for thrombocytopenia. RECOMMENDATIONS:    Chart review only. Formal consult and note by Dr. Jesenia Patel to follow. Ferrlecit and IVIG ordered. Check hapto and smear.          HEIKE Franks  Hematology & Oncology  [address] [address]  Office : [phone]  Fax : [phone]

## 2023-02-10 NOTE — PROGRESS NOTES
RRT Clinical Rounding Nurse Update    Vitals:    02/09/23 2150 02/09/23 2258 02/09/23 2259 02/09/23 2328   BP: 114/87 122/83 122/83    Pulse: 63 62 58 54   Resp: 16 17 17 18   Temp: 98.2 °F (36.8 °C) 98.2 °F (36.8 °C) 98.2 °F (36.8 °C)    TempSrc:  Oral Oral    SpO2: 100% 98% 98% 100%   Weight:   100 lb 8 oz (45.6 kg)    Height:   5' 4\" (1.626 m)         DETERIORATION INDEX SCORE: 15    ASSESSMENT:  Previous outreach assessment was reviewed. There have been no significant changes since previous assessment. PLAN:  Will follow per RRT Clinical Rounding Program protocol.  Repeat labs to be drawn this AM.    Bennett Blackman, [address]: [facility]: [phone]

## 2023-02-10 NOTE — PROGRESS NOTES
RRT Clinical Rounding Nurse Progress Report      SUBJECTIVE: Called to assess patient secondary to RN/provider concern - pt noted to have PLT count of 2 in ED. Vitals:    02/09/23 2150 02/09/23 2258 02/09/23 2259 02/09/23 2328   BP: 114/87 122/83 122/83    Pulse: 63 62 58 54   Resp: 16 17 17 18   Temp: 98.2 °F (36.8 °C) 98.2 °F (36.8 °C) 98.2 °F (36.8 °C)    TempSrc:  Oral Oral    SpO2: 100% 98% 98% 100%   Weight:   100 lb 8 oz (45.6 kg)    Height:   5' 4\" (1.626 m)         DETERIORATION INDEX SCORE: 15    ASSESSMENT:  Patient in bed awake with primary RN at bedside. VSS. Bilateral lungs clear. Patient reports no pain at this time. Received 1 unit PLT's. Latest Plt level was 24. Dr. Mcduffie Doing notified and no new orders received. PLAN:  Will follow per RRT Clinical Rounding Program protocol.     Franky Mosley, [address]: [facility]: [phone]

## 2023-02-10 NOTE — PROGRESS NOTES
Hospitalist Progress Note   Admit Date:  2023  6:38 PM   Name:  Juliann Herrera   Age:  23 y.o. Sex:  female  :  2000   MRN:  [id number]   Room:  361/    Presenting Complaint:   Reason(s) for Admission: Thrombocytopenia [facility]) [D69.6]     Hospital Course & Interval History:   23year old female who presented to ED with epistaxis and rash on 23. She reported that over the past 2 days, she has developed \"rashes\" on both legs. She has also been having intermittent nose bleeds as well. Of note, she was recently seen in the ER on 23 with abdominal pain and diarrhea, diagnosed with a viral gastroenteritis at that time which did not require admission. She reports some residual abdominal pain, but that has been improving over the past week. Upon ER evaluation, platelet count is 2. LDH is 247. Hgb is 12.3. Labs otherwise normal.    Subjective/24hr Events (02/10/23): She remains lethargic. No BM in 2 days. Still with purpura in mouth and on legs. Still has epigastric pain. Denies CP/palpitations. Denies SOB/cough. Denies N/V/D. Denies F/C. Assessment & Plan:     Principal Problem: Thrombocytopenia ([address]  - Unsure etiology --> ITP?  - Post recent possible viral gastroenteritis  - No fevers  -  Plt 2 --> given 1U Platelets --> Plt 24  -  Plt 2 --> 1U Platelets --> Plt 38  - Check CBC Q8H  - Check SPEP  - Consult to Hematology    Active Problems:    Pancytopenia ([address]  - All blood counts dropping  - 2/10 Most recent - WBC 3.9 + Hgb 11.5 + Plt 38 (after platelet transfusion)  - Iron levels normal, but lowish  - Retic count normal  - G77 normal  - Folic acid normal  - Check CBC Q8H  - Check SPEP  - Consult Hematology    Diet:  ADULT DIET; Regular  DVT PPx: None  Code status: Full Code    Hospital Problems:  Principal Problem: Thrombocytopenia ([address]  Active Problems:    Pancytopenia ([address]  Resolved Problems:    * No resolved hospital problems.  *      Discharge Plan:     Location: Home  Days: 2-3  PPD Ordered: N/A    Objective:   Patient Vitals for the past 24 hrs:   Temp Pulse Resp BP SpO2   02/10/23 1114 98.6 °F (37 °C) 61 17 113/77 98 %   02/10/23 1022 98.2 °F (36.8 °C) 69 20 110/76 99 %   02/10/23 0953 98.4 °F (36.9 °C) 82 18 110/73 98 %   02/10/23 0731 98.4 °F (36.9 °C) 67 18 118/72 95 %   02/10/23 0459 98.3 °F (36.8 °C) 56 18 123/81 98 %   02/09/23 2328 -- 54 18 -- 100 %   02/09/23 2259 98.2 °F (36.8 °C) 58 17 122/83 98 %   02/09/23 2258 98.2 °F (36.8 °C) 62 17 122/83 98 %   02/09/23 2150 98.2 °F (36.8 °C) 63 16 114/87 100 %   02/09/23 2133 99 °F (37.2 °C) 72 16 117/77 100 %   02/09/23 2116 99 °F (37.2 °C) -- -- -- --   02/09/23 1802 99.7 °F (37.6 °C) 82 16 121/72 100 %       Oxygen Therapy  SpO2: 98 %  Pulse Oximeter Device Mode: Intermittent  O2 Device: None (Room air)    Estimated body mass index is 17.25 kg/m² as calculated from the following:    Height as of this encounter: 5' 4\" (1.626 m). Weight as of this encounter: 100 lb 8 oz (45.6 kg). Intake/Output Summary (Last 24 hours) at 2/10/2023 1410  Last data filed at 2/10/2023 1114  Gross per 24 hour   Intake 599.33 ml   Output --   Net 599.33 ml         Physical Exam:   Blood pressure 113/77, pulse 61, temperature 98.6 °F (37 °C), temperature source Oral, resp. rate 17, height 5' 4\" (1.626 m), weight 100 lb 8 oz (45.6 kg), SpO2 98 %. General:    Well nourished. No overt distress  Head:  Normocephalic, atraumatic. Multiple oral petechiae. Eyes:  Sclerae appear normal. Pupils equally round. ENT:  Nares appear normal, no drainage. Moist oral mucosa  Neck:  No restricted ROM. Trachea midline   CV:   RRR. No m/r/g. No jugular venous distension. Lungs:   CTAB. No wheezing, rhonchi, or rales. Respirations even, unlabored  Abdomen: Bowel sounds present. Soft, epigastric tender, nondistended. Extremities: No cyanosis or clubbing. No edema  Skin:     BLE petechiae. Warm and dry. Neuro:  CN II-XII grossly intact. Sensation intact. A&Ox3  Psych:  Normal mood and affect. I have reviewed ordered lab tests and independently visualized imaging below:    Recent Labs:  Recent Results (from the past 48 hour(s))   Protime-INR    Collection Time: 02/09/23  7:02 PM   Result Value Ref Range    Protime 12.4 (L) 12.6 - 14.3 sec    INR 0.9     CBC with Diff    Collection Time: 02/09/23  7:03 PM   Result Value Ref Range    WBC 6.2 4.3 - 11.1 K/uL    RBC 4.53 4.05 - 5.2 M/uL    Hemoglobin 12.3 11.7 - 15.4 g/dL    Hematocrit 36.6 35.8 - 46.3 %    MCV 80.8 (L) 82.0 - 102.0 FL    MCH 27.2 26.1 - 32.9 PG    MCHC 33.6 31.4 - 35.0 g/dL    RDW 12.7 11.9 - 14.6 %    Platelets 2 (LL) 908 - 450 K/uL    MPV Unable to calculate. Recommend adding IPF.  9.4 - 12.3 FL    nRBC 0.00 0.0 - 0.2 K/uL    Differential Type AUTOMATED      Seg Neutrophils 54 43 - 78 %    Lymphocytes 37 13 - 44 %    Monocytes 7 4.0 - 12.0 %    Eosinophils % 2 0.5 - 7.8 %    Basophils 0 0.0 - 2.0 %    Immature Granulocytes 0 0.0 - 5.0 %    Segs Absolute 3.3 1.7 - 8.2 K/UL    Absolute Lymph # 2.3 0.5 - 4.6 K/UL    Absolute Mono # 0.4 0.1 - 1.3 K/UL    Absolute Eos # 0.1 0.0 - 0.8 K/UL    Basophils Absolute 0.0 0.0 - 0.2 K/UL    Absolute Immature Granulocyte 0.0 0.0 - 0.5 K/UL   CMP    Collection Time: 02/09/23  7:03 PM   Result Value Ref Range    Sodium 137 133 - 143 mmol/L    Potassium 3.5 3.5 - 5.1 mmol/L    Chloride 104 101 - 110 mmol/L    CO2 28 21 - 32 mmol/L    Anion Gap 5 2 - 11 mmol/L    Glucose 98 65 - 100 mg/dL    BUN 9 6 - 23 MG/DL    Creatinine 0.67 0.6 - 1.0 MG/DL    Est, Glom Filt Rate >60 >60 ml/min/1.73m2    Calcium 9.3 8.3 - 10.4 MG/DL    Total Bilirubin 0.7 0.2 - 1.1 MG/DL    ALT 23 12 - 65 U/L    AST 19 15 - 37 U/L    Alk Phosphatase 68 50 - 136 U/L    Total Protein 8.6 (H) 6.3 - 8.2 g/dL    Albumin 3.9 3.5 - 5.0 g/dL    Globulin 4.7 (H) 2.8 - 4.5 g/dL    Albumin/Globulin Ratio 0.8 0.4 - 1.6     Lipase    Collection Time: 02/09/23  7:03 PM   Result Value Ref Range    Lipase 142 73 - 393 U/L   TYPE AND SCREEN    Collection Time: 02/09/23  7:03 PM   Result Value Ref Range    Crossmatch expiration date 02/12/2023,2359     ABO/Rh B POSITIVE     Antibody Screen POS     Antibody Ident UNIDENTIFIED ANTIBODY     Polyspecific Marian NEG    Lactate Dehydrogenase    Collection Time: 02/09/23  7:03 PM   Result Value Ref Range     (H) 100 - 190 U/L   Troponin    Collection Time: 02/09/23  7:03 PM   Result Value Ref Range    Troponin, High Sensitivity 4.0 0 - 14 pg/mL   EKG 12 Lead    Collection Time: 02/09/23  7:14 PM   Result Value Ref Range    Ventricular Rate 72 BPM    Atrial Rate 72 BPM    P-R Interval 156 ms    QRS Duration 72 ms    Q-T Interval 423 ms    QTc Calculation (Bazett) 463 ms    P Axis 55 degrees    R Axis 79 degrees    T Axis 56 degrees    Diagnosis Sinus rhythm    PREPARE PLATELETS, 1 Product    Collection Time: 02/09/23  7:45 PM   Result Value Ref Range    Unit Number [id number]     Product Code Blood Bank [facility],LR1     Unit Divison 00     Dispense Status Blood Bank TRANSFUSED    CBC    Collection Time: 02/10/23 12:08 AM   Result Value Ref Range    WBC 4.9 4.3 - 11.1 K/uL    RBC 4.28 4.05 - 5.2 M/uL    Hemoglobin 11.5 (L) 11.7 - 15.4 g/dL    Hematocrit 34.2 (L) 35.8 - 46.3 %    MCV 79.9 (L) 82.0 - 102.0 FL    MCH 26.9 26.1 - 32.9 PG    MCHC 33.6 31.4 - 35.0 g/dL    RDW 12.6 11.9 - 14.6 %    Platelets 24 (LL) 370 - 450 K/uL    MPV Unable to calculate. Recommend adding IPF.  9.4 - 12.3 FL    nRBC 0.00 0.0 - 0.2 K/uL   Basic Metabolic Panel w/ Reflex to MG    Collection Time: 02/10/23  6:06 AM   Result Value Ref Range    Sodium 140 133 - 143 mmol/L    Potassium 4.0 3.5 - 5.1 mmol/L    Chloride 110 101 - 110 mmol/L    CO2 26 21 - 32 mmol/L    Anion Gap 4 2 - 11 mmol/L    Glucose 101 (H) 65 - 100 mg/dL    BUN 9 6 - 23 MG/DL    Creatinine 0.62 0.6 - 1.0 MG/DL    Est, Glom Filt Rate >60 >60 ml/min/1.73m2    Calcium 9.1 8.3 - 10.4 MG/DL   CBC with Auto Differential Collection Time: 02/10/23  6:06 AM   Result Value Ref Range    WBC 4.5 4.3 - 11.1 K/uL    RBC 4.37 4.05 - 5.2 M/uL    Hemoglobin 11.6 (L) 11.7 - 15.4 g/dL    Hematocrit 34.9 (L) 35.8 - 46.3 %    MCV 79.9 (L) 82.0 - 102.0 FL    MCH 26.5 26.1 - 32.9 PG    MCHC 33.2 31.4 - 35.0 g/dL    RDW 12.6 11.9 - 14.6 %    Platelets 2 (LL) 034 - 450 K/uL    MPV Unable to calculate. Recommend adding IPF.  9.4 - 12.3 FL    nRBC 0.00 0.0 - 0.2 K/uL    Differential Type AUTOMATED      Seg Neutrophils 62 43 - 78 %    Lymphocytes 31 13 - 44 %    Monocytes 4 4.0 - 12.0 %    Eosinophils % 3 0.5 - 7.8 %    Basophils 0 0.0 - 2.0 %    Immature Granulocytes 0 0.0 - 5.0 %    Segs Absolute 2.8 1.7 - 8.2 K/UL    Absolute Lymph # 1.4 0.5 - 4.6 K/UL    Absolute Mono # 0.2 0.1 - 1.3 K/UL    Absolute Eos # 0.1 0.0 - 0.8 K/UL    Basophils Absolute 0.0 0.0 - 0.2 K/UL    Absolute Immature Granulocyte 0.0 0.0 - 0.5 K/UL   PREPARE PLATELETS, 1 Product    Collection Time: 02/10/23  6:30 AM   Result Value Ref Range    Unit Number [id number]     Product Code Blood Bank RANDOM PLATELETS     Unit Divison 00     Dispense Status Blood Bank ISSUED    Fibrinogen    Collection Time: 02/10/23  7:28 AM   Result Value Ref Range    Fibrinogen 294 190 - 501 mg/dL   APTT    Collection Time: 02/10/23  7:28 AM   Result Value Ref Range    PTT 75.0 (H) 24.5 - 34.2 SEC   Protime-INR    Collection Time: 02/10/23  7:28 AM   Result Value Ref Range    Protime 13.4 12.6 - 14.3 sec    INR 1.0     D-Dimer, Quantitative    Collection Time: 02/10/23  7:28 AM   Result Value Ref Range    D-Dimer, Quant 1.09 (H) <0.56 ug/ml(FEU)   Reticulocytes    Collection Time: 02/10/23  7:28 AM   Result Value Ref Range    Reticulocyte Count,Automated 0.9 0.3 - 2.0 %    Absolute Retic # 0.0401 0.026 - 0.095 M/ul    Immature Retic Fraction 5.6 3.0 - 15.9 %    Retic Hemoglobin conc. 33 29 - 35 pg   Transferrin Saturation    Collection Time: 02/10/23  7:28 AM   Result Value Ref Range    Iron 50 35 - 150 ug/dL    TIBC 329 250 - 450 ug/dL    TRANSFERRIN SATURATION 15 %   Ferritin    Collection Time: 02/10/23  7:28 AM   Result Value Ref Range    Ferritin 29 8 - 388 NG/ML   Folate    Collection Time: 02/10/23  7:28 AM   Result Value Ref Range    Folate 11.6 3.1 - 17.5 ng/mL   Vitamin B12    Collection Time: 02/10/23  7:28 AM   Result Value Ref Range    Vitamin B-12 519 193 - 986 pg/mL   CBC    Collection Time: 02/10/23  1:25 PM   Result Value Ref Range    WBC 3.9 (L) 4.3 - 11.1 K/uL    RBC 4.25 4.05 - 5.2 M/uL    Hemoglobin 11.5 (L) 11.7 - 15.4 g/dL    Hematocrit 34.2 (L) 35.8 - 46.3 %    MCV 80.5 (L) 82.0 - 102.0 FL    MCH 27.1 26.1 - 32.9 PG    MCHC 33.6 31.4 - 35.0 g/dL    RDW 12.7 11.9 - 14.6 %    Platelets 38 (L) 190 - 450 K/uL    MPV 9.6 9.4 - 12.3 FL    nRBC 0.00 0.0 - 0.2 K/uL       Other Studies:  XR ACUTE ABD SERIES CHEST 1 VW    Result Date: 2/9/2023  EXAMINATION: Single view chest and three-view abdomen DATE: 2/9/2023 7:00 PM COMPARISON: None CLINICAL HISTORY: Chest and abdominal pain FINDINGS: Costophrenic angles are clear. Heart size is normal. Pulmonary vasculature is not distended. There are no dense regions of consolidation. No free air identified in the abdomen. No dilated loops of large and small bowel. Moderate amount retained stool within the descending and distal colon. No acute cardiopulmonary process. Moderate amount of retained stool in the colon. Nonobstructive bowel gas pattern Elian  . Queen Madyson RODRIGUEZ 2/9/2023 7:35:00 PM      Current Meds:  Current Facility-Administered Medications   Medication Dose Route Frequency    0.9 % sodium chloride infusion   IntraVENous PRN    ferric gluconate (FERRLECIT) 250 mg in sodium chloride 0.9 % 250 mL IVPB  250 mg IntraVENous Once    immune globulin (GAMUNEX-C) 10% solution 45 g  1 g/kg IntraVENous Once    0.9 % sodium chloride infusion   IntraVENous PRN    sodium chloride flush 0.9 % injection 5-40 mL  5-40 mL IntraVENous 2 times per day sodium chloride flush 0.9 % injection 5-40 mL  5-40 mL IntraVENous PRN    0.9 % sodium chloride infusion   IntraVENous PRN    ondansetron (ZOFRAN-ODT) disintegrating tablet 4 mg  4 mg Oral Q8H PRN    Or    ondansetron (ZOFRAN) injection 4 mg  4 mg IntraVENous Q6H PRN    polyethylene glycol (GLYCOLAX) packet 17 g  17 g Oral Daily PRN    acetaminophen (TYLENOL) tablet 650 mg  650 mg Oral Q6H PRN    Or    acetaminophen (TYLENOL) suppository 650 mg  650 mg Rectal Q6H PRN       Signed:  Rebeca Mg DO  2/10/2023

## 2023-02-10 NOTE — PROGRESS NOTES
[facility]  [address] [facility] [address]  Phone: [phone]             February 10, 2023    Patient: Jossie Hooper   YOB: 2000   Date of Visit: 2/9/2023       To Whom It May Concern:    Ronaldomelvina Ron was seen and treated in our facility  beginning 2/9/2023 until (current) 2/10/23. Sincerely,       Kaylynn Perea RN         Signature:__________________________________

## 2023-02-10 NOTE — ED NOTES
This RN attempted to give report to [address] [facility] floor.  Floor is confirming correct floor placement with hospitalist.      Lucas Fuller RN  02/09/23 2205

## 2023-02-10 NOTE — ED NOTES
TRANSFER - OUT REPORT:    Verbal report given to Donnie Woodson RN on ROSIO Woodson  being transferred to [address] [place] [address] for routine progression of patient care       Report consisted of patient's Situation, Background, Assessment and   Recommendations(SBAR). Information from the following report(s) ED SBAR was reviewed with the receiving nurse. [place] Assessment: No data recorded  Lines:   Peripheral IV 02/09/23 Left Antecubital (Active)   Site Assessment Clean, dry & intact 02/09/23 1917   Line Status Blood return noted 02/09/23 1917   Phlebitis Assessment No symptoms 02/09/23 1917   Infiltration Assessment 0 02/09/23 1917   Dressing Status Clean, dry & intact 02/09/23 1917   Dressing Type Transparent 02/09/23 1917   Dressing Intervention New 02/09/23 1917       Peripheral IV 02/09/23 Right Antecubital (Active)   Site Assessment Clean, dry & intact 02/09/23 2155   Line Status Blood return noted;Brisk blood return;Flushed;Normal saline locked 02/09/23 2155   Phlebitis Assessment No symptoms 02/09/23 2155   Infiltration Assessment 0 02/09/23 2155   Dressing Status New dressing applied;Clean, dry & intact 02/09/23 2155   Dressing Type Transparent 02/09/23 2155   Dressing Intervention New 02/09/23 2155        Opportunity for questions and clarification was provided.       Patient transported with:  Registered Nurse           Vonnie Link RN  02/09/23 3116

## 2023-02-10 NOTE — PLAN OF CARE
One bag of platelets given, CBC ordered one hour after transfusion. Platelet count 38 on recheck. MD Amador notified. Second bag of platelets not given at this time per MD. Ferrlecit and gamunex-c were ordered for pt.     Problem: Discharge Planning  Goal: Discharge to home or other facility with appropriate resources  Outcome: Progressing     Problem: Pain  Goal: Verbalizes/displays adequate comfort level or baseline comfort level  Outcome: Progressing

## 2023-02-10 NOTE — CONSENT
Informed Consent for Blood Component Transfusion Note    MARIIA Lozanorafi Crabtree has discussed with the patient the rationale for blood component transfusion; its benefits in treating or preventing fatigue, organ damage, or death; and its risk which includes mild transfusion reactions, rare risk of blood borne infection, or more serious but rare reactions. He has discussed the alternatives to transfusion, including the risk and consequences of not receiving transfusion. The patient had an opportunity to ask questions and had agreed to proceed with transfusion of blood components.     Electronically signed by Daren Grossman MD on 2/10/23 at 6:26 AM EST

## 2023-02-10 NOTE — H&P
Hospitalist History and Physical   Admit Date:  2023  6:38 PM   Name:  Yane Jolley   Age:  23 y.o. Sex:  female  :  2000   MRN:  [id number]     Presenting Complaint: rash  Reason(s) for Admission: Thrombocytopenia ([facility] [D69.6]     History of Present Illness:   Yane Jolley is a 23 y.o. female who presented to ED with epistaxis and rash. Patient reports that over the past 2 days, she has developed \"rashes\" on both legs. She has also been having intermittent nose bleeds as well. Of note, she was recently seen in the ER on 23 with abdominal pain and diarrhea, diagnosed with a viral gastroenteritis at that time which did not require admission. She reports some residual abdominal pain, but that has been improving over the past week. Upon ER evaluation, platelet count is 2. LDH is 247. Hgb is 12.3. Labs otherwise normal.  Hospitalist asked to admit. Review of Systems:  10 systems reviewed and negative except as noted in HPI. Assessment & Plan:   * Thrombocytopenia ([facility]  Assessment & Plan  - Possibly due to recent viral gastroenteritis  - No other known medical history  - Petechiae covering BLE and oral palate as well  - No active bleeding  - Hgb 12.3 currently  - No anticoagulation  - Transfusing platelets now  - Will trend plts to keep above at least 20,000 as patient is not actively bleeding. If patient starts to bleed, will need to keep at least >50,000  - Consult to Oncology      Disposition: inpatient    Past medical history reviewed. History reviewed. No pertinent past medical history. Past surgical history reviewed. History reviewed. No pertinent surgical history. No Known Allergies   Social History     Tobacco Use    Smoking status: Never    Smokeless tobacco: Never   Substance Use Topics    Alcohol use: Never      History reviewed. No pertinent family history.    Family history reviewed and noncontributory to patient's acute condition; no relevant family history unless otherwise noted above. There is no immunization history on file for this patient. PTA Medications:  No current outpatient medications    Objective:   Patient Vitals for the past 24 hrs:   Temp Pulse Resp BP SpO2   02/10/23 0459 98.3 °F (36.8 °C) 56 18 123/81 98 %   02/09/23 2328 -- 54 18 -- 100 %   02/09/23 2259 98.2 °F (36.8 °C) 58 17 122/83 98 %   02/09/23 2258 98.2 °F (36.8 °C) 62 17 122/83 98 %   02/09/23 2150 98.2 °F (36.8 °C) 63 16 114/87 100 %   02/09/23 2133 99 °F (37.2 °C) 72 16 117/77 100 %   02/09/23 2116 99 °F (37.2 °C) -- -- -- --   02/09/23 1802 99.7 °F (37.6 °C) 82 16 121/72 100 %          Estimated body mass index is 17.25 kg/m² as calculated from the following:    Height as of this encounter: 5' 4\" (1.626 m). Weight as of this encounter: 100 lb 8 oz (45.6 kg). Intake/Output Summary (Last 24 hours) at 2/10/2023 0555  Last data filed at 2/9/2023 2258  Gross per 24 hour   Intake 232.33 ml   Output --   Net 232.33 ml         Physical Exam:  General:    Well nourished. No overt distress  Head:  Normocephalic, atraumatic  Eyes:  Sclerae appear normal.  Pupils equally round. HENT:  Nares appear normal, no drainage. Moist mucous membranes. Few small petechiae noted on palate  Neck:  No restricted ROM. Trachea midline  CV:   RRR. S1/S2 auscultated  Lungs:   CTAB. No wheezing, rhonchi, or rales. Appears even, unlabored  Abdomen: Bowel sounds present. Soft, nontender, nondistended. Extremities: Warm and dry. No cyanosis or clubbing. No edema. Skin:     Normal turgor. Normal coloration. Petechiae diffusely over BLE  Neuro:  Cranial nerves II-XII grossly intact. Sensation intact  Psych:  Normal mood and affect.   Alert and oriented x3    Data Ordered and Personally Reviewed:    Last 24hr Labs:  Recent Results (from the past 24 hour(s))   Protime-INR    Collection Time: 02/09/23  7:02 PM   Result Value Ref Range    Protime 12.4 (L) 12.6 - 14.3 sec    INR 0.9     CBC with Diff    Collection Time: 02/09/23  7:03 PM   Result Value Ref Range    WBC 6.2 4.3 - 11.1 K/uL    RBC 4.53 4.05 - 5.2 M/uL    Hemoglobin 12.3 11.7 - 15.4 g/dL    Hematocrit 36.6 35.8 - 46.3 %    MCV 80.8 (L) 82.0 - 102.0 FL    MCH 27.2 26.1 - 32.9 PG    MCHC 33.6 31.4 - 35.0 g/dL    RDW 12.7 11.9 - 14.6 %    Platelets 2 (LL) 870 - 450 K/uL    MPV Unable to calculate. Recommend adding IPF.  9.4 - 12.3 FL    nRBC 0.00 0.0 - 0.2 K/uL    Differential Type AUTOMATED      Seg Neutrophils 54 43 - 78 %    Lymphocytes 37 13 - 44 %    Monocytes 7 4.0 - 12.0 %    Eosinophils % 2 0.5 - 7.8 %    Basophils 0 0.0 - 2.0 %    Immature Granulocytes 0 0.0 - 5.0 %    Segs Absolute 3.3 1.7 - 8.2 K/UL    Absolute Lymph # 2.3 0.5 - 4.6 K/UL    Absolute Mono # 0.4 0.1 - 1.3 K/UL    Absolute Eos # 0.1 0.0 - 0.8 K/UL    Basophils Absolute 0.0 0.0 - 0.2 K/UL    Absolute Immature Granulocyte 0.0 0.0 - 0.5 K/UL   CMP    Collection Time: 02/09/23  7:03 PM   Result Value Ref Range    Sodium 137 133 - 143 mmol/L    Potassium 3.5 3.5 - 5.1 mmol/L    Chloride 104 101 - 110 mmol/L    CO2 28 21 - 32 mmol/L    Anion Gap 5 2 - 11 mmol/L    Glucose 98 65 - 100 mg/dL    BUN 9 6 - 23 MG/DL    Creatinine 0.67 0.6 - 1.0 MG/DL    Est, Glom Filt Rate >60 >60 ml/min/1.73m2    Calcium 9.3 8.3 - 10.4 MG/DL    Total Bilirubin 0.7 0.2 - 1.1 MG/DL    ALT 23 12 - 65 U/L    AST 19 15 - 37 U/L    Alk Phosphatase 68 50 - 136 U/L    Total Protein 8.6 (H) 6.3 - 8.2 g/dL    Albumin 3.9 3.5 - 5.0 g/dL    Globulin 4.7 (H) 2.8 - 4.5 g/dL    Albumin/Globulin Ratio 0.8 0.4 - 1.6     Lipase    Collection Time: 02/09/23  7:03 PM   Result Value Ref Range    Lipase 142 73 - 393 U/L   TYPE AND SCREEN    Collection Time: 02/09/23  7:03 PM   Result Value Ref Range    Crossmatch expiration date 02/12/2023,3482     ABO/Rh B POSITIVE     Antibody Screen POS     Antibody Ident UNIDENTIFIED ANTIBODY     Polyspecific Marian NEG    Lactate Dehydrogenase    Collection Time: 02/09/23  7:03 PM Result Value Ref Range     (H) 100 - 190 U/L   Troponin    Collection Time: 02/09/23  7:03 PM   Result Value Ref Range    Troponin, High Sensitivity 4.0 0 - 14 pg/mL   EKG 12 Lead    Collection Time: 02/09/23  7:14 PM   Result Value Ref Range    Ventricular Rate 72 BPM    Atrial Rate 72 BPM    P-R Interval 156 ms    QRS Duration 72 ms    Q-T Interval 423 ms    QTc Calculation (Bazett) 463 ms    P Axis 55 degrees    R Axis 79 degrees    T Axis 56 degrees    Diagnosis Sinus rhythm    PREPARE PLATELETS, 1 Product    Collection Time: 02/09/23  7:45 PM   Result Value Ref Range    Unit Number [id number]     Product Code Blood Bank [facility],LR1     Unit Divison 00     Dispense Status Blood Bank ISSUED    CBC    Collection Time: 02/10/23 12:08 AM   Result Value Ref Range    WBC 4.9 4.3 - 11.1 K/uL    RBC 4.28 4.05 - 5.2 M/uL    Hemoglobin 11.5 (L) 11.7 - 15.4 g/dL    Hematocrit 34.2 (L) 35.8 - 46.3 %    MCV 79.9 (L) 82.0 - 102.0 FL    MCH 26.9 26.1 - 32.9 PG    MCHC 33.6 31.4 - 35.0 g/dL    RDW 12.6 11.9 - 14.6 %    Platelets 24 (LL) 111 - 450 K/uL    MPV Unable to calculate. Recommend adding IPF.  9.4 - 12.3 FL    nRBC 0.00 0.0 - 0.2 K/uL       Signed:  Rajeev Marmolejo MD

## 2023-02-11 LAB
ANION GAP SERPL CALC-SCNC: 5 MMOL/L (ref 2–11)
BASOPHILS # BLD: 0 K/UL (ref 0–0.2)
BASOPHILS NFR BLD: 0 % (ref 0–2)
BLD PROD TYP BPU: NORMAL
BLOOD BANK DISPENSE STATUS: NORMAL
BPU ID: NORMAL
BUN SERPL-MCNC: 11 MG/DL (ref 6–23)
CALCIUM SERPL-MCNC: 9.2 MG/DL (ref 8.3–10.4)
CHLORIDE SERPL-SCNC: 108 MMOL/L (ref 101–110)
CO2 SERPL-SCNC: 24 MMOL/L (ref 21–32)
CREAT SERPL-MCNC: 0.66 MG/DL (ref 0.6–1)
DIFFERENTIAL METHOD BLD: ABNORMAL
EOSINOPHIL # BLD: 0 K/UL (ref 0–0.8)
EOSINOPHIL NFR BLD: 0 % (ref 0.5–7.8)
ERYTHROCYTE [DISTWIDTH] IN BLOOD BY AUTOMATED COUNT: 12.7 % (ref 11.9–14.6)
ERYTHROCYTE [DISTWIDTH] IN BLOOD BY AUTOMATED COUNT: 12.7 % (ref 11.9–14.6)
ERYTHROCYTE [DISTWIDTH] IN BLOOD BY AUTOMATED COUNT: 12.9 % (ref 11.9–14.6)
GLUCOSE SERPL-MCNC: 140 MG/DL (ref 65–100)
HCT VFR BLD AUTO: 32.6 % (ref 35.8–46.3)
HCT VFR BLD AUTO: 33.3 % (ref 35.8–46.3)
HCT VFR BLD AUTO: 34 % (ref 35.8–46.3)
HGB BLD-MCNC: 11.1 G/DL (ref 11.7–15.4)
HGB BLD-MCNC: 11.2 G/DL (ref 11.7–15.4)
HGB BLD-MCNC: 11.3 G/DL (ref 11.7–15.4)
IMM GRANULOCYTES # BLD AUTO: 0 K/UL (ref 0–0.5)
IMM GRANULOCYTES # BLD AUTO: 0 K/UL (ref 0–0.5)
IMM GRANULOCYTES # BLD AUTO: 0.1 K/UL (ref 0–0.5)
IMM GRANULOCYTES NFR BLD AUTO: 0 % (ref 0–5)
IMM GRANULOCYTES NFR BLD AUTO: 0 % (ref 0–5)
IMM GRANULOCYTES NFR BLD AUTO: 1 % (ref 0–5)
LYMPHOCYTES # BLD: 0.7 K/UL (ref 0.5–4.6)
LYMPHOCYTES # BLD: 0.7 K/UL (ref 0.5–4.6)
LYMPHOCYTES # BLD: 1.7 K/UL (ref 0.5–4.6)
LYMPHOCYTES NFR BLD: 11 % (ref 13–44)
LYMPHOCYTES NFR BLD: 14 % (ref 13–44)
LYMPHOCYTES NFR BLD: 16 % (ref 13–44)
MCH RBC QN AUTO: 26.8 PG (ref 26.1–32.9)
MCH RBC QN AUTO: 26.9 PG (ref 26.1–32.9)
MCH RBC QN AUTO: 27 PG (ref 26.1–32.9)
MCHC RBC AUTO-ENTMCNC: 33.2 G/DL (ref 31.4–35)
MCHC RBC AUTO-ENTMCNC: 33.6 G/DL (ref 31.4–35)
MCHC RBC AUTO-ENTMCNC: 34 G/DL (ref 31.4–35)
MCV RBC AUTO: 79.3 FL (ref 82–102)
MCV RBC AUTO: 79.9 FL (ref 82–102)
MCV RBC AUTO: 80.8 FL (ref 82–102)
MONOCYTES # BLD: 0.1 K/UL (ref 0.1–1.3)
MONOCYTES # BLD: 0.1 K/UL (ref 0.1–1.3)
MONOCYTES # BLD: 0.7 K/UL (ref 0.1–1.3)
MONOCYTES NFR BLD: 2 % (ref 4–12)
MONOCYTES NFR BLD: 2 % (ref 4–12)
MONOCYTES NFR BLD: 6 % (ref 4–12)
NEUTS SEG # BLD: 4 K/UL (ref 1.7–8.2)
NEUTS SEG # BLD: 5.4 K/UL (ref 1.7–8.2)
NEUTS SEG # BLD: 8 K/UL (ref 1.7–8.2)
NEUTS SEG NFR BLD: 77 % (ref 43–78)
NEUTS SEG NFR BLD: 83 % (ref 43–78)
NEUTS SEG NFR BLD: 88 % (ref 43–78)
NRBC # BLD: 0 K/UL (ref 0–0.2)
PLATELET # BLD AUTO: 13 K/UL (ref 150–450)
PLATELET # BLD AUTO: 28 K/UL (ref 150–450)
PLATELET # BLD AUTO: 54 K/UL (ref 150–450)
PMV BLD AUTO: ABNORMAL FL (ref 9.4–12.3)
POTASSIUM SERPL-SCNC: 4.3 MMOL/L (ref 3.5–5.1)
RBC # BLD AUTO: 4.11 M/UL (ref 4.05–5.2)
RBC # BLD AUTO: 4.17 M/UL (ref 4.05–5.2)
RBC # BLD AUTO: 4.21 M/UL (ref 4.05–5.2)
SODIUM SERPL-SCNC: 137 MMOL/L (ref 133–143)
UNIT DIVISION: 0
WBC # BLD AUTO: 10.5 K/UL (ref 4.3–11.1)
WBC # BLD AUTO: 4.9 K/UL (ref 4.3–11.1)
WBC # BLD AUTO: 6.1 K/UL (ref 4.3–11.1)

## 2023-02-11 PROCEDURE — 80048 BASIC METABOLIC PNL TOTAL CA: CPT

## 2023-02-11 PROCEDURE — 6370000000 HC RX 637 (ALT 250 FOR IP): Performed by: INTERNAL MEDICINE

## 2023-02-11 PROCEDURE — 85025 COMPLETE CBC W/AUTO DIFF WBC: CPT

## 2023-02-11 PROCEDURE — 99232 SBSQ HOSP IP/OBS MODERATE 35: CPT | Performed by: INTERNAL MEDICINE

## 2023-02-11 PROCEDURE — 1100000000 HC RM PRIVATE

## 2023-02-11 PROCEDURE — 36415 COLL VENOUS BLD VENIPUNCTURE: CPT

## 2023-02-11 PROCEDURE — 6370000000 HC RX 637 (ALT 250 FOR IP): Performed by: FAMILY MEDICINE

## 2023-02-11 PROCEDURE — 2580000003 HC RX 258: Performed by: FAMILY MEDICINE

## 2023-02-11 RX ORDER — HYDROCODONE BITARTRATE AND ACETAMINOPHEN 5; 325 MG/1; MG/1
1 TABLET ORAL ONCE
Status: COMPLETED | OUTPATIENT
Start: 2023-02-11 | End: 2023-02-11

## 2023-02-11 RX ADMIN — SODIUM CHLORIDE, PRESERVATIVE FREE 10 ML: 5 INJECTION INTRAVENOUS at 16:17

## 2023-02-11 RX ADMIN — DOCUSATE SODIUM 100 MG: 100 CAPSULE ORAL at 20:14

## 2023-02-11 RX ADMIN — POLYETHYLENE GLYCOL 3350 17 G: 17 POWDER, FOR SOLUTION ORAL at 09:08

## 2023-02-11 RX ADMIN — PREDNISONE 50 MG: 50 TABLET ORAL at 09:07

## 2023-02-11 RX ADMIN — HYDROCODONE BITARTRATE AND ACETAMINOPHEN 1 TABLET: 5; 325 TABLET ORAL at 00:22

## 2023-02-11 RX ADMIN — PANTOPRAZOLE SODIUM 40 MG: 40 TABLET, DELAYED RELEASE ORAL at 05:53

## 2023-02-11 RX ADMIN — DOCUSATE SODIUM 100 MG: 100 CAPSULE ORAL at 09:07

## 2023-02-11 RX ADMIN — SODIUM CHLORIDE, PRESERVATIVE FREE 10 ML: 5 INJECTION INTRAVENOUS at 20:15

## 2023-02-11 RX ADMIN — PANTOPRAZOLE SODIUM 40 MG: 40 TABLET, DELAYED RELEASE ORAL at 16:17

## 2023-02-11 ASSESSMENT — PAIN DESCRIPTION - LOCATION: LOCATION: ARM;NECK

## 2023-02-11 ASSESSMENT — PAIN SCALES - GENERAL: PAINLEVEL_OUTOF10: 5

## 2023-02-11 NOTE — FLOWSHEET NOTE
RRT Clinical Rounding Nurse Update    Vitals:    02/10/23 1505 02/10/23 1958 02/10/23 2352 02/11/23 0328   BP: (!) 142/80 124/75 109/74 117/79   Pulse: 64 63 56 62   Resp: 18 16 16 14   Temp: 98.4 °F (36.9 °C) 98.1 °F (36.7 °C) 98.4 °F (36.9 °C) 98.2 °F (36.8 °C)   TempSrc: Oral Oral Oral Oral   SpO2: 97% 99% 97% 98%   Weight:       Height:            DETERIORATION INDEX SCORE: 14    ASSESSMENT:  Previous outreach assessment was reviewed. There have been no significant changes since previous assessment. PLAN:  Will follow per RRT Clinical Rounding Program protocol.     Wendy Waldron RN  [place]: [place]: [phone]

## 2023-02-11 NOTE — PROGRESS NOTES
Hospitalist Progress Note   Admit Date:  2023  6:38 PM   Name:  Jossie Hooper   Age:  23 y.o. Sex:  female  :  2000   MRN:  [id number]   Room:  [facility]/    Presenting Complaint:   Reason(s) for Admission: Thrombocytopenia [facility]) [D69.6]     Hospital Course & Interval History:   23year old female who presented to ED with epistaxis and rash on 23. She reported that over the past 2 days, she has developed \"rashes\" on both legs. She has also been having intermittent nose bleeds as well. Of note, she was recently seen in the ER on 23 with abdominal pain and diarrhea, diagnosed with a viral gastroenteritis at that time which did not require admission. She reports some residual abdominal pain, but that has been improving over the past week. Upon ER evaluation, platelet count is 2. LDH is 247. Hgb is 12.3. Labs otherwise normal.    Subjective/24hr Events (23): She feels better today. Abdominal pain improved. Still with purpura in mouth and on legs. Still has epigastric pain. Denies CP/palpitations. Denies SOB/cough. Denies N/V/D. Denies F/C. Assessment & Plan:     Principal Problem: Thrombocytopenia ([facility]  - Unsure etiology --> likely ITP  - Post recent possible viral gastroenteritis  - No fevers  -  Plt 2 --> given 1U Platelets --> Plt 24  - 2/10 Plt 2 --> 1U Platelets --> Plt 38  -  Plt 13 --> ??  - S/P IVIG by Hematology  - Continue Prednisone  - Check CBC Q8H  - SPEP pending  - Appreciate Hematology's input and assistance    Active Problems:    Pancytopenia ([facility]  - All blood counts dropping  - 2/10 Most recent - WBC 3.9 + Hgb 11.5 + Plt 38 (after platelet transfusion)  -  WBC improved >4, Hgb stable  - Iron levels normal, but lowish  - 2/10 S/P IV Iron  - Retic count normal  - X61 normal  - Folic acid normal  - Check CBC Q8H  - SPEP pending  - Appreciate Hematology's input and assistance    Diet:  ADULT DIET;  Regular  DVT PPx: None  Code status: Full Code    Hospital Problems:  Principal Problem:    Severe thrombocytopenia (HCC)  Active Problems:    Pancytopenia ([address]    Epistaxis  Resolved Problems:    * No resolved hospital problems. *      Discharge Plan:     Location: Home  Days: 2-3  PPD Ordered: N/A    Objective:   Patient Vitals for the past 24 hrs:   Temp Pulse Resp BP SpO2   02/11/23 1118 97.2 °F (36.2 °C) 80 16 118/66 98 %   02/11/23 0714 97.3 °F (36.3 °C) 62 18 112/68 96 %   02/11/23 0328 98.2 °F (36.8 °C) 62 14 117/79 98 %   02/10/23 2352 98.4 °F (36.9 °C) 56 16 109/74 97 %   02/10/23 1958 98.1 °F (36.7 °C) 63 16 124/75 99 %   02/10/23 1505 98.4 °F (36.9 °C) 64 18 (!) 142/80 97 %       Oxygen Therapy  SpO2: 98 %  Pulse Oximeter Device Mode: Intermittent  O2 Device: None (Room air)    Estimated body mass index is 17.25 kg/m² as calculated from the following:    Height as of this encounter: 5' 4\" (1.626 m). Weight as of this encounter: 100 lb 8 oz (45.6 kg). Intake/Output Summary (Last 24 hours) at 2/11/2023 1159  Last data filed at 2/11/2023 0328  Gross per 24 hour   Intake 340 ml   Output --   Net 340 ml         Physical Exam:   Blood pressure 118/66, pulse 80, temperature 97.2 °F (36.2 °C), temperature source Oral, resp. rate 16, height 5' 4\" (1.626 m), weight 100 lb 8 oz (45.6 kg), SpO2 98 %. General:    Well nourished. No overt distress  Head:  Normocephalic, atraumatic. Multiple oral petechiae. Eyes:  Sclerae appear normal. Pupils equally round. ENT:  Nares appear normal, no drainage. Moist oral mucosa  Neck:  No restricted ROM. Trachea midline   CV:   RRR. No m/r/g. No jugular venous distension. Lungs:   CTAB. No wheezing, rhonchi, or rales. Respirations even, unlabored  Abdomen: Bowel sounds present. Soft, epigastric tender, nondistended. Extremities: No cyanosis or clubbing. No edema  Skin:     BLE petechiae. Warm and dry. Neuro:  CN II-XII grossly intact. Sensation intact. A&Ox3  Psych:  Normal mood and affect. I have reviewed ordered lab tests and independently visualized imaging below:    Recent Labs:  Recent Results (from the past 48 hour(s))   Protime-INR    Collection Time: 02/09/23  7:02 PM   Result Value Ref Range    Protime 12.4 (L) 12.6 - 14.3 sec    INR 0.9     CBC with Diff    Collection Time: 02/09/23  7:03 PM   Result Value Ref Range    WBC 6.2 4.3 - 11.1 K/uL    RBC 4.53 4.05 - 5.2 M/uL    Hemoglobin 12.3 11.7 - 15.4 g/dL    Hematocrit 36.6 35.8 - 46.3 %    MCV 80.8 (L) 82.0 - 102.0 FL    MCH 27.2 26.1 - 32.9 PG    MCHC 33.6 31.4 - 35.0 g/dL    RDW 12.7 11.9 - 14.6 %    Platelets 2 (LL) 433 - 450 K/uL    MPV Unable to calculate. Recommend adding IPF.  9.4 - 12.3 FL    nRBC 0.00 0.0 - 0.2 K/uL    Differential Type AUTOMATED      Seg Neutrophils 54 43 - 78 %    Lymphocytes 37 13 - 44 %    Monocytes 7 4.0 - 12.0 %    Eosinophils % 2 0.5 - 7.8 %    Basophils 0 0.0 - 2.0 %    Immature Granulocytes 0 0.0 - 5.0 %    Segs Absolute 3.3 1.7 - 8.2 K/UL    Absolute Lymph # 2.3 0.5 - 4.6 K/UL    Absolute Mono # 0.4 0.1 - 1.3 K/UL    Absolute Eos # 0.1 0.0 - 0.8 K/UL    Basophils Absolute 0.0 0.0 - 0.2 K/UL    Absolute Immature Granulocyte 0.0 0.0 - 0.5 K/UL   CMP    Collection Time: 02/09/23  7:03 PM   Result Value Ref Range    Sodium 137 133 - 143 mmol/L    Potassium 3.5 3.5 - 5.1 mmol/L    Chloride 104 101 - 110 mmol/L    CO2 28 21 - 32 mmol/L    Anion Gap 5 2 - 11 mmol/L    Glucose 98 65 - 100 mg/dL    BUN 9 6 - 23 MG/DL    Creatinine 0.67 0.6 - 1.0 MG/DL    Est, Glom Filt Rate >60 >60 ml/min/1.73m2    Calcium 9.3 8.3 - 10.4 MG/DL    Total Bilirubin 0.7 0.2 - 1.1 MG/DL    ALT 23 12 - 65 U/L    AST 19 15 - 37 U/L    Alk Phosphatase 68 50 - 136 U/L    Total Protein 8.6 (H) 6.3 - 8.2 g/dL    Albumin 3.9 3.5 - 5.0 g/dL    Globulin 4.7 (H) 2.8 - 4.5 g/dL    Albumin/Globulin Ratio 0.8 0.4 - 1.6     Lipase    Collection Time: 02/09/23  7:03 PM   Result Value Ref Range    Lipase 142 73 - 393 U/L   TYPE AND SCREEN Collection Time: 02/09/23  7:03 PM   Result Value Ref Range    Crossmatch expiration date 02/12/2023,1135     ABO/Rh B POSITIVE     Antibody Screen POS     Antibody Ident UNIDENTIFIED ANTIBODY     Polyspecific Marian NEG    Lactate Dehydrogenase    Collection Time: 02/09/23  7:03 PM   Result Value Ref Range     (H) 100 - 190 U/L   Troponin    Collection Time: 02/09/23  7:03 PM   Result Value Ref Range    Troponin, High Sensitivity 4.0 0 - 14 pg/mL   EKG 12 Lead    Collection Time: 02/09/23  7:14 PM   Result Value Ref Range    Ventricular Rate 72 BPM    Atrial Rate 72 BPM    P-R Interval 156 ms    QRS Duration 72 ms    Q-T Interval 423 ms    QTc Calculation (Bazett) 463 ms    P Axis 55 degrees    R Axis 79 degrees    T Axis 56 degrees    Diagnosis Sinus rhythm    PREPARE PLATELETS, 1 Product    Collection Time: 02/09/23  7:45 PM   Result Value Ref Range    Unit Number [id number]     Product Code Blood Bank [facility],LR1     Unit Divison 00     Dispense Status Blood Bank TRANSFUSED    CBC    Collection Time: 02/10/23 12:08 AM   Result Value Ref Range    WBC 4.9 4.3 - 11.1 K/uL    RBC 4.28 4.05 - 5.2 M/uL    Hemoglobin 11.5 (L) 11.7 - 15.4 g/dL    Hematocrit 34.2 (L) 35.8 - 46.3 %    MCV 79.9 (L) 82.0 - 102.0 FL    MCH 26.9 26.1 - 32.9 PG    MCHC 33.6 31.4 - 35.0 g/dL    RDW 12.6 11.9 - 14.6 %    Platelets 24 (LL) 320 - 450 K/uL    MPV Unable to calculate. Recommend adding IPF.  9.4 - 12.3 FL    nRBC 0.00 0.0 - 0.2 K/uL   Basic Metabolic Panel w/ Reflex to MG    Collection Time: 02/10/23  6:06 AM   Result Value Ref Range    Sodium 140 133 - 143 mmol/L    Potassium 4.0 3.5 - 5.1 mmol/L    Chloride 110 101 - 110 mmol/L    CO2 26 21 - 32 mmol/L    Anion Gap 4 2 - 11 mmol/L    Glucose 101 (H) 65 - 100 mg/dL    BUN 9 6 - 23 MG/DL    Creatinine 0.62 0.6 - 1.0 MG/DL    Est, Glom Filt Rate >60 >60 ml/min/1.73m2    Calcium 9.1 8.3 - 10.4 MG/DL   CBC with Auto Differential    Collection Time: 02/10/23  6:06 AM   Result Value Ref Range    WBC 4.5 4.3 - 11.1 K/uL    RBC 4.37 4.05 - 5.2 M/uL    Hemoglobin 11.6 (L) 11.7 - 15.4 g/dL    Hematocrit 34.9 (L) 35.8 - 46.3 %    MCV 79.9 (L) 82.0 - 102.0 FL    MCH 26.5 26.1 - 32.9 PG    MCHC 33.2 31.4 - 35.0 g/dL    RDW 12.6 11.9 - 14.6 %    Platelets 2 (LL) 389 - 450 K/uL    MPV Unable to calculate. Recommend adding IPF.  9.4 - 12.3 FL    nRBC 0.00 0.0 - 0.2 K/uL    Differential Type AUTOMATED      Seg Neutrophils 62 43 - 78 %    Lymphocytes 31 13 - 44 %    Monocytes 4 4.0 - 12.0 %    Eosinophils % 3 0.5 - 7.8 %    Basophils 0 0.0 - 2.0 %    Immature Granulocytes 0 0.0 - 5.0 %    Segs Absolute 2.8 1.7 - 8.2 K/UL    Absolute Lymph # 1.4 0.5 - 4.6 K/UL    Absolute Mono # 0.2 0.1 - 1.3 K/UL    Absolute Eos # 0.1 0.0 - 0.8 K/UL    Basophils Absolute 0.0 0.0 - 0.2 K/UL    Absolute Immature Granulocyte 0.0 0.0 - 0.5 K/UL   PREPARE PLATELETS, 1 Product    Collection Time: 02/10/23  6:30 AM   Result Value Ref Range    Unit Number [id number]     Product Code Blood Bank RANDOM PLATELETS     Unit Divison 00     Dispense Status Blood Bank TRANSFUSED    Fibrinogen    Collection Time: 02/10/23  7:28 AM   Result Value Ref Range    Fibrinogen 294 190 - 501 mg/dL   APTT    Collection Time: 02/10/23  7:28 AM   Result Value Ref Range    PTT 75.0 (H) 24.5 - 34.2 SEC   Protime-INR    Collection Time: 02/10/23  7:28 AM   Result Value Ref Range    Protime 13.4 12.6 - 14.3 sec    INR 1.0     D-Dimer, Quantitative    Collection Time: 02/10/23  7:28 AM   Result Value Ref Range    D-Dimer, Quant 1.09 (H) <0.56 ug/ml(FEU)   Reticulocytes    Collection Time: 02/10/23  7:28 AM   Result Value Ref Range    Reticulocyte Count,Automated 0.9 0.3 - 2.0 %    Absolute Retic # 0.0401 0.026 - 0.095 M/ul    Immature Retic Fraction 5.6 3.0 - 15.9 %    Retic Hemoglobin conc. 33 29 - 35 pg   Transferrin Saturation    Collection Time: 02/10/23  7:28 AM   Result Value Ref Range    Iron 50 35 - 150 ug/dL    TIBC 329 250 - 450 ug/dL TRANSFERRIN SATURATION 15 %   Ferritin    Collection Time: 02/10/23  7:28 AM   Result Value Ref Range    Ferritin 29 8 - 388 NG/ML   Folate    Collection Time: 02/10/23  7:28 AM   Result Value Ref Range    Folate 11.6 3.1 - 17.5 ng/mL   Vitamin B12    Collection Time: 02/10/23  7:28 AM   Result Value Ref Range    Vitamin B-12 519 193 - 986 pg/mL   CBC    Collection Time: 02/10/23  1:25 PM   Result Value Ref Range    WBC 3.9 (L) 4.3 - 11.1 K/uL    RBC 4.25 4.05 - 5.2 M/uL    Hemoglobin 11.5 (L) 11.7 - 15.4 g/dL    Hematocrit 34.2 (L) 35.8 - 46.3 %    MCV 80.5 (L) 82.0 - 102.0 FL    MCH 27.1 26.1 - 32.9 PG    MCHC 33.6 31.4 - 35.0 g/dL    RDW 12.7 11.9 - 14.6 %    Platelets 38 (L) 535 - 450 K/uL    MPV 9.6 9.4 - 12.3 FL    nRBC 0.00 0.0 - 0.2 K/uL   Haptoglobin    Collection Time: 02/10/23  1:25 PM   Result Value Ref Range    Haptoglobin 135 30 - 200 mg/dL   Path Review, Smear    Collection Time: 02/10/23  1:25 PM   Result Value Ref Range    Pathologist Review (NOTE)    Basic Metabolic Panel w/ Reflex to MG    Collection Time: 02/11/23  6:03 AM   Result Value Ref Range    Sodium 137 133 - 143 mmol/L    Potassium 4.3 3.5 - 5.1 mmol/L    Chloride 108 101 - 110 mmol/L    CO2 24 21 - 32 mmol/L    Anion Gap 5 2 - 11 mmol/L    Glucose 140 (H) 65 - 100 mg/dL    BUN 11 6 - 23 MG/DL    Creatinine 0.66 0.6 - 1.0 MG/DL    Est, Glom Filt Rate >60 >60 ml/min/1.73m2    Calcium 9.2 8.3 - 10.4 MG/DL   CBC with Auto Differential    Collection Time: 02/11/23  6:03 AM   Result Value Ref Range    WBC 4.9 4.3 - 11.1 K/uL    RBC 4.21 4.05 - 5.2 M/uL    Hemoglobin 11.3 (L) 11.7 - 15.4 g/dL    Hematocrit 34.0 (L) 35.8 - 46.3 %    MCV 80.8 (L) 82.0 - 102.0 FL    MCH 26.8 26.1 - 32.9 PG    MCHC 33.2 31.4 - 35.0 g/dL    RDW 12.7 11.9 - 14.6 %    Platelets 13 (LL) 818 - 450 K/uL    MPV Unable to calculate. Recommend adding IPF.  9.4 - 12.3 FL    nRBC 0.00 0.0 - 0.2 K/uL    Differential Type AUTOMATED      Seg Neutrophils 83 (H) 43 - 78 % Lymphocytes 14 13 - 44 %    Monocytes 2 (L) 4.0 - 12.0 %    Eosinophils % 0 (L) 0.5 - 7.8 %    Basophils 0 0.0 - 2.0 %    Immature Granulocytes 0 0.0 - 5.0 %    Segs Absolute 4.0 1.7 - 8.2 K/UL    Absolute Lymph # 0.7 0.5 - 4.6 K/UL    Absolute Mono # 0.1 0.1 - 1.3 K/UL    Absolute Eos # 0.0 0.0 - 0.8 K/UL    Basophils Absolute 0.0 0.0 - 0.2 K/UL    Absolute Immature Granulocyte 0.0 0.0 - 0.5 K/UL       Other Studies:  XR ACUTE ABD SERIES CHEST 1 VW    Result Date: 2/9/2023  EXAMINATION: Single view chest and three-view abdomen DATE: 2/9/2023 7:00 PM COMPARISON: None CLINICAL HISTORY: Chest and abdominal pain FINDINGS: Costophrenic angles are clear. Heart size is normal. Pulmonary vasculature is not distended. There are no dense regions of consolidation. No free air identified in the abdomen. No dilated loops of large and small bowel. Moderate amount retained stool within the descending and distal colon. No acute cardiopulmonary process. Moderate amount of retained stool in the colon. Nonobstructive bowel gas pattern Elian De La Cruz M.D. 2/9/2023 7:35:00 PM      Current Meds:  Current Facility-Administered Medications   Medication Dose Route Frequency    0.9 % sodium chloride infusion   IntraVENous PRN    pantoprazole (PROTONIX) tablet 40 mg  40 mg Oral BID AC    docusate sodium (COLACE) capsule 100 mg  100 mg Oral BID    polyethylene glycol (GLYCOLAX) packet 17 g  17 g Oral Daily    predniSONE (DELTASONE) tablet 50 mg  50 mg Oral Daily    0.9 % sodium chloride infusion   IntraVENous PRN    sodium chloride flush 0.9 % injection 5-40 mL  5-40 mL IntraVENous 2 times per day    sodium chloride flush 0.9 % injection 5-40 mL  5-40 mL IntraVENous PRN    0.9 % sodium chloride infusion   IntraVENous PRN    ondansetron (ZOFRAN-ODT) disintegrating tablet 4 mg  4 mg Oral Q8H PRN    Or    ondansetron (ZOFRAN) injection 4 mg  4 mg IntraVENous Q6H PRN    polyethylene glycol (GLYCOLAX) packet 17 g  17 g Oral Daily PRN acetaminophen (TYLENOL) tablet 650 mg  650 mg Oral Q6H PRN    Or    acetaminophen (TYLENOL) suppository 650 mg  650 mg Rectal Q6H PRN       Signed:  Naga Chase DO  2/11/2023

## 2023-02-11 NOTE — CONSULTS
[facility] Hematology & Oncology        Inpatient Hematology / Oncology Consult Note    Reason for Consult: Thrombocytopenia [facility]) [D69.6]  Referring Physician:  Juan Dunbar DO    History of Present Illness:  Ms. Stevie Lew is a 23 y.o. female admitted on 2/9/2023 with a primary diagnosis of The encounter diagnosis was Thrombocytopenia ([address] .      24 y.o. female without significant past medical history presented to the emergency room complaining of epistaxes and rash. Patient reports developing rashes on both legs over the past 2 days, having been having intermittent nosebleeds as well. She was recently seen in the ER on 2/4/2023 with abdominal pain and diarrhea, diagnosed with a viral gastroenteritis at that time which did not require admission. She reports some residue abdominal pain but there has been improvement over the past week. ED work-up showed WBC 6.5, hemoglobin 12.3 down to 11.5, platelet 2. Abdominal imaging was unremarkable. She is iron deficient with transferrin saturation 15%. PTT elevated at 75, normal INR/PTT/fibrinogen level. SPEP pending. Hematology is consulted. Review of Systems:  Constitutional Denies fever, chills, weight loss, appetite changes, fatigue, night sweats. HEENT Epistaxis. Denies trauma, blurry vision, hearing loss, ear pain, sore throat, neck pain and ear discharge. Skin Skin rash. Denies lesions   Lungs Denies dyspnea, cough, sputum production or hemoptysis. Cardiovascular Denies chest pain, palpitations, or lower extremity edema. Gastrointestinal Diarrhea. Denies nausea, vomiting, bloody or black stools, abdominal pain.  Denies dysuria, frequency or hesitancy of urination. Neuro Denies headaches, visual changes or ataxia. Denies dizziness, tingling, tremors, sensory change, speech change, focal weakness or headaches. Hematology Denies easy bruising or bleeding, denies gingival bleeding or epistaxis.    Endo Denies heat/cold intolerance, denies diabetes or thyroid abnormalities. MSK Denies back pain, arthralgias, myalgias or frequent falls. Psychiatric/Behavioral Denies depression and substance abuse. The patient is not nervous/anxious. No Known Allergies  History reviewed. No pertinent past medical history. History reviewed. No pertinent surgical history. History reviewed. No pertinent family history.   Social History     Socioeconomic History    Marital status: Single     Spouse name: Not on file    Number of children: Not on file    Years of education: Not on file    Highest education level: Not on file   Occupational History    Not on file   Tobacco Use    Smoking status: Never    Smokeless tobacco: Never   Substance and Sexual Activity    Alcohol use: Never    Drug use: Never    Sexual activity: Not on file   Other Topics Concern    Not on file   Social History Narrative    Not on file     Social Determinants of Health     Financial Resource Strain: Not on file   Food Insecurity: Not on file   Transportation Needs: Not on file   Physical Activity: Not on file   Stress: Not on file   Social Connections: Not on file   Intimate Partner Violence: Not on file   Housing Stability: Not on file     Current Facility-Administered Medications   Medication Dose Route Frequency Provider Last Rate Last Admin    0.9 % sodium chloride infusion   IntraVENous PRN Bobby Bell MD        pantoprazole (PROTONIX) tablet 40 mg  40 mg Oral BID AC Markell Amador, DO   40 mg at 02/10/23 1614    docusate sodium (COLACE) capsule 100 mg  100 mg Oral BID Markell Amador DO        polyethylene glycol (GLYCOLAX) packet 17 g  17 g Oral Daily Markell Amador DO   17 g at 02/10/23 1614    0.9 % sodium chloride infusion   IntraVENous PRN MARIIA Ozuna        sodium chloride flush 0.9 % injection 5-40 mL  5-40 mL IntraVENous 2 times per day Bobby Bell MD   10 mL at 02/10/23 0913    sodium chloride flush 0.9 % injection 5-40 mL  5-40 mL IntraVENous SAMUEL Grossman MD        0.9 % sodium chloride infusion   IntraVENous PRN Daren Grossman MD        ondansetron (ZOFRAN-ODT) disintegrating tablet 4 mg  4 mg Oral Q8H PRN Daren Grossman MD        Or    ondansetron [facility]) injection 4 mg  4 mg IntraVENous Q6H PRN Daren Grossman MD        polyethylene glycol (GLYCOLAX) packet 17 g  17 g Oral Daily PRN Daren Grossman MD        acetaminophen (TYLENOL) tablet 650 mg  650 mg Oral Q6H PRN Daren Grossman MD   650 mg at 02/10/23 2783    Or    acetaminophen (TYLENOL) suppository 650 mg  650 mg Rectal Q6H PRN Darne Grossman MD           OBJECTIVE:  Patient Vitals for the past 8 hrs:   BP Temp Temp src Pulse Resp SpO2   02/10/23 1505 (!) 142/80 98.4 °F (36.9 °C) Oral 64 18 97 %     Temp (24hrs), Av.4 °F (36.9 °C), Min:98.2 °F (36.8 °C), Max:99 °F (37.2 °C)    No intake/output data recorded. Physical Exam:  Constitutional: Well developed, well nourished female in no acute distress, sitting comfortably in the hospital bed. HEENT: Normocephalic and atraumatic. Oropharynx is clear, mucous membranes are moist.  Pupils are equal, round, and reactive to light. Extraocular muscles are intact. Sclerae anicteric. Neck supple without JVD. No thyromegaly present. Lymph node   No palpable submandibular, cervical, supraclavicular, axillary or inguinal lymph nodes. Skin Warm and dry. No bruising and no rash noted. No erythema. No pallor. Respiratory Lungs are clear to auscultation bilaterally without wheezes, rales or rhonchi, normal air exchange without accessory muscle use. CVS Normal rate, regular rhythm and normal S1 and S2. No murmurs, gallops, or rubs. Abdomen Soft, nontender and nondistended, normoactive bowel sounds. No palpable mass. No hepatosplenomegaly. Neuro Grossly nonfocal with no obvious sensory or motor deficits. MSK Normal range of motion in general.  No edema and no tenderness.    Psych Appropriate mood and affect. Labs:    Recent Results (from the past 24 hour(s))   CBC    Collection Time: 02/10/23 12:08 AM   Result Value Ref Range    WBC 4.9 4.3 - 11.1 K/uL    RBC 4.28 4.05 - 5.2 M/uL    Hemoglobin 11.5 (L) 11.7 - 15.4 g/dL    Hematocrit 34.2 (L) 35.8 - 46.3 %    MCV 79.9 (L) 82.0 - 102.0 FL    MCH 26.9 26.1 - 32.9 PG    MCHC 33.6 31.4 - 35.0 g/dL    RDW 12.6 11.9 - 14.6 %    Platelets 24 (LL) 471 - 450 K/uL    MPV Unable to calculate. Recommend adding IPF. 9.4 - 12.3 FL    nRBC 0.00 0.0 - 0.2 K/uL   Basic Metabolic Panel w/ Reflex to MG    Collection Time: 02/10/23  6:06 AM   Result Value Ref Range    Sodium 140 133 - 143 mmol/L    Potassium 4.0 3.5 - 5.1 mmol/L    Chloride 110 101 - 110 mmol/L    CO2 26 21 - 32 mmol/L    Anion Gap 4 2 - 11 mmol/L    Glucose 101 (H) 65 - 100 mg/dL    BUN 9 6 - 23 MG/DL    Creatinine 0.62 0.6 - 1.0 MG/DL    Est, Glom Filt Rate >60 >60 ml/min/1.73m2    Calcium 9.1 8.3 - 10.4 MG/DL   CBC with Auto Differential    Collection Time: 02/10/23  6:06 AM   Result Value Ref Range    WBC 4.5 4.3 - 11.1 K/uL    RBC 4.37 4.05 - 5.2 M/uL    Hemoglobin 11.6 (L) 11.7 - 15.4 g/dL    Hematocrit 34.9 (L) 35.8 - 46.3 %    MCV 79.9 (L) 82.0 - 102.0 FL    MCH 26.5 26.1 - 32.9 PG    MCHC 33.2 31.4 - 35.0 g/dL    RDW 12.6 11.9 - 14.6 %    Platelets 2 (LL) 631 - 450 K/uL    MPV Unable to calculate. Recommend adding IPF.  9.4 - 12.3 FL    nRBC 0.00 0.0 - 0.2 K/uL    Differential Type AUTOMATED      Seg Neutrophils 62 43 - 78 %    Lymphocytes 31 13 - 44 %    Monocytes 4 4.0 - 12.0 %    Eosinophils % 3 0.5 - 7.8 %    Basophils 0 0.0 - 2.0 %    Immature Granulocytes 0 0.0 - 5.0 %    Segs Absolute 2.8 1.7 - 8.2 K/UL    Absolute Lymph # 1.4 0.5 - 4.6 K/UL    Absolute Mono # 0.2 0.1 - 1.3 K/UL    Absolute Eos # 0.1 0.0 - 0.8 K/UL    Basophils Absolute 0.0 0.0 - 0.2 K/UL    Absolute Immature Granulocyte 0.0 0.0 - 0.5 K/UL   PREPARE PLATELETS, 1 Product    Collection Time: 02/10/23  6:30 AM   Result Value Ref Range    Unit Number [id number]     Product Code Blood Bank RANDOM PLATELETS     Unit Divison 00     Dispense Status Blood Bank ISSUED    Fibrinogen    Collection Time: 02/10/23  7:28 AM   Result Value Ref Range    Fibrinogen 294 190 - 501 mg/dL   APTT    Collection Time: 02/10/23  7:28 AM   Result Value Ref Range    PTT 75.0 (H) 24.5 - 34.2 SEC   Protime-INR    Collection Time: 02/10/23  7:28 AM   Result Value Ref Range    Protime 13.4 12.6 - 14.3 sec    INR 1.0     D-Dimer, Quantitative    Collection Time: 02/10/23  7:28 AM   Result Value Ref Range    D-Dimer, Quant 1.09 (H) <0.56 ug/ml(FEU)   Reticulocytes    Collection Time: 02/10/23  7:28 AM   Result Value Ref Range    Reticulocyte Count,Automated 0.9 0.3 - 2.0 %    Absolute Retic # 0.0401 0.026 - 0.095 M/ul    Immature Retic Fraction 5.6 3.0 - 15.9 %    Retic Hemoglobin conc. 33 29 - 35 pg   Transferrin Saturation    Collection Time: 02/10/23  7:28 AM   Result Value Ref Range    Iron 50 35 - 150 ug/dL    TIBC 329 250 - 450 ug/dL    TRANSFERRIN SATURATION 15 %   Ferritin    Collection Time: 02/10/23  7:28 AM   Result Value Ref Range    Ferritin 29 8 - 388 NG/ML   Folate    Collection Time: 02/10/23  7:28 AM   Result Value Ref Range    Folate 11.6 3.1 - 17.5 ng/mL   Vitamin B12    Collection Time: 02/10/23  7:28 AM   Result Value Ref Range    Vitamin B-12 519 193 - 986 pg/mL   CBC    Collection Time: 02/10/23  1:25 PM   Result Value Ref Range    WBC 3.9 (L) 4.3 - 11.1 K/uL    RBC 4.25 4.05 - 5.2 M/uL    Hemoglobin 11.5 (L) 11.7 - 15.4 g/dL    Hematocrit 34.2 (L) 35.8 - 46.3 %    MCV 80.5 (L) 82.0 - 102.0 FL    MCH 27.1 26.1 - 32.9 PG    MCHC 33.6 31.4 - 35.0 g/dL    RDW 12.7 11.9 - 14.6 %    Platelets 38 (L) 022 - 450 K/uL    MPV 9.6 9.4 - 12.3 FL    nRBC 0.00 0.0 - 0.2 K/uL   Haptoglobin    Collection Time: 02/10/23  1:25 PM   Result Value Ref Range    Haptoglobin 135 30 - 200 mg/dL   Path Review, Smear    Collection Time: 02/10/23  1:25 PM Result Value Ref Range    Pathologist Review (NOTE)        Imaging:  [unfilled]    ASSESSMENT/RECOMMENDATIONS:  [unfilled]  68-year-old female without past medical history other recent viral gastritis developed epistaxis and admitted for severe thrombocytopenia of 2, mild decrease of hemoglobin, needed response to platelet transfusion but consumes quickly, peripheral smear showed no schistocytes and no morphological abnormalities, most suspicious of ITP, arrange IVIG and start prednisone 60 mg daily, transfuse to keep platelet above 10. Lab studies and imaging studies were personally reviewed. Pertinent old records were reviewed. Case discussed with primary team.    Thank you for allowing us to participate in the care of Ms. Tim Bello Yvette Fowler M.D.   [address], [address] [place] [address]  Office : [phone]  Fax : [phone]

## 2023-02-11 NOTE — PROGRESS NOTES
[facility] Hematology & Oncology        Inpatient Hematology / Oncology Progress Note    Reason for Consult: Thrombocytopenia [facility]) [D69.6]  Referring Physician:  Alejandra Mejia DO    History of Present Illness:  Ms. Magnolia Diaz is a 23 y.o. female admitted on 2/9/2023 with a primary diagnosis of The encounter diagnosis was Thrombocytopenia ([address] .      24 y.o. female without significant past medical history presented to the emergency room complaining of epistaxes and rash. Patient reports developing rashes on both legs over the past 2 days, having been having intermittent nosebleeds as well. She was recently seen in the ER on 2/4/2023 with abdominal pain and diarrhea, diagnosed with a viral gastroenteritis at that time which did not require admission. She reports some residue abdominal pain but there has been improvement over the past week. ED work-up showed WBC 6.5, hemoglobin 12.3 down to 11.5, platelet 2. Abdominal imaging was unremarkable. She is iron deficient with transferrin saturation 15%. PTT elevated at 75, normal INR/PTT/fibrinogen level. SPEP pending. Hematology is consulted. 24 hours: plt 13, no new petechia    Review of Systems:  Constitutional Denies fever, chills, weight loss, appetite changes, fatigue, night sweats. HEENT Epistaxis. Denies trauma, blurry vision, hearing loss, ear pain, sore throat, neck pain and ear discharge. Skin Skin rash. Denies lesions   Lungs Denies dyspnea, cough, sputum production or hemoptysis. Cardiovascular Denies chest pain, palpitations, or lower extremity edema. Gastrointestinal Diarrhea. Denies nausea, vomiting, bloody or black stools, abdominal pain.  Denies dysuria, frequency or hesitancy of urination. Neuro Denies headaches, visual changes or ataxia. Denies dizziness, tingling, tremors, sensory change, speech change, focal weakness or headaches. Hematology Denies easy bruising or bleeding, denies gingival bleeding or epistaxis. Endo Denies heat/cold intolerance, denies diabetes or thyroid abnormalities. MSK Denies back pain, arthralgias, myalgias or frequent falls. Psychiatric/Behavioral Denies depression and substance abuse. The patient is not nervous/anxious. No Known Allergies  History reviewed. No pertinent past medical history. History reviewed. No pertinent surgical history. History reviewed. No pertinent family history.   Social History     Socioeconomic History    Marital status: Single     Spouse name: Not on file    Number of children: Not on file    Years of education: Not on file    Highest education level: Not on file   Occupational History    Not on file   Tobacco Use    Smoking status: Never    Smokeless tobacco: Never   Substance and Sexual Activity    Alcohol use: Never    Drug use: Never    Sexual activity: Not on file   Other Topics Concern    Not on file   Social History Narrative    Not on file     Social Determinants of Health     Financial Resource Strain: Not on file   Food Insecurity: Not on file   Transportation Needs: Not on file   Physical Activity: Not on file   Stress: Not on file   Social Connections: Not on file   Intimate Partner Violence: Not on file   Housing Stability: Not on file     Current Facility-Administered Medications   Medication Dose Route Frequency Provider Last Rate Last Admin    0.9 % sodium chloride infusion   IntraVENous PRN Kathryn Zelaya MD        pantoprazole (PROTONIX) tablet 40 mg  40 mg Oral BID AC Markell Amador, DO   40 mg at 02/11/23 0553    docusate sodium (COLACE) capsule 100 mg  100 mg Oral BID Markell Amador, DO   100 mg at 02/11/23 0907    polyethylene glycol (GLYCOLAX) packet 17 g  17 g Oral Daily Markell Amador, DO   17 g at 02/11/23 0908    predniSONE (DELTASONE) tablet 50 mg  50 mg Oral Daily Melanie Cage MD   50 mg at 02/11/23 0907    0.9 % sodium chloride infusion   IntraVENous PRN MARIIA Herrera        sodium chloride flush 0.9 % injection 5-40 mL  5-40 mL IntraVENous 2 times per day Huyen Peguero MD   10 mL at 02/10/23 2056    sodium chloride flush 0.9 % injection 5-40 mL  5-40 mL IntraVENous PRN Huyen Peguero MD        0.9 % sodium chloride infusion   IntraVENous PRN Huyen Peguero MD        ondansetron (ZOFRAN-ODT) disintegrating tablet 4 mg  4 mg Oral Q8H PRN Huyen Peguero MD        Or    ondansetron [facility]) injection 4 mg  4 mg IntraVENous Q6H PRN Huyen Peguero MD        polyethylene glycol (GLYCOLAX) packet 17 g  17 g Oral Daily PRN Huyen Peguero MD        acetaminophen (TYLENOL) tablet 650 mg  650 mg Oral Q6H PRN Huyen Peguero MD   650 mg at 02/10/23 2054    Or    acetaminophen (TYLENOL) suppository 650 mg  650 mg Rectal Q6H PRN Huyen Peguero MD           OBJECTIVE:  Patient Vitals for the past 8 hrs:   BP Temp Temp src Pulse Resp SpO2   23 1118 118/66 97.2 °F (36.2 °C) Oral 80 16 98 %       Temp (24hrs), Av.8 °F (36.6 °C), Min:97.2 °F (36.2 °C), Max:98.4 °F (36.9 °C)    701 - 1900  In: 240 [P.O.:240]  Out: -     Physical Exam:  Constitutional: Well developed, well nourished female in no acute distress, sitting comfortably in the hospital bed. HEENT: Normocephalic and atraumatic. Oropharynx is clear, mucous membranes are moist.  Pupils are equal, round, and reactive to light. Extraocular muscles are intact. Sclerae anicteric. Neck supple without JVD. No thyromegaly present. Lymph node   No palpable submandibular, cervical, supraclavicular, axillary or inguinal lymph nodes. Skin Warm and dry. No bruising and no rash noted. No erythema. No pallor. Respiratory Lungs are clear to auscultation bilaterally without wheezes, rales or rhonchi, normal air exchange without accessory muscle use. CVS Normal rate, regular rhythm and normal S1 and S2. No murmurs, gallops, or rubs. Abdomen Soft, nontender and nondistended, normoactive bowel sounds. No palpable mass.   No hepatosplenomegaly. Neuro Grossly nonfocal with no obvious sensory or motor deficits. MSK Normal range of motion in general.  No edema and no tenderness. Psych Appropriate mood and affect. Labs:    Recent Results (from the past 24 hour(s))   Basic Metabolic Panel w/ Reflex to MG    Collection Time: 02/11/23  6:03 AM   Result Value Ref Range    Sodium 137 133 - 143 mmol/L    Potassium 4.3 3.5 - 5.1 mmol/L    Chloride 108 101 - 110 mmol/L    CO2 24 21 - 32 mmol/L    Anion Gap 5 2 - 11 mmol/L    Glucose 140 (H) 65 - 100 mg/dL    BUN 11 6 - 23 MG/DL    Creatinine 0.66 0.6 - 1.0 MG/DL    Est, Glom Filt Rate >60 >60 ml/min/1.73m2    Calcium 9.2 8.3 - 10.4 MG/DL   CBC with Auto Differential    Collection Time: 02/11/23  6:03 AM   Result Value Ref Range    WBC 4.9 4.3 - 11.1 K/uL    RBC 4.21 4.05 - 5.2 M/uL    Hemoglobin 11.3 (L) 11.7 - 15.4 g/dL    Hematocrit 34.0 (L) 35.8 - 46.3 %    MCV 80.8 (L) 82.0 - 102.0 FL    MCH 26.8 26.1 - 32.9 PG    MCHC 33.2 31.4 - 35.0 g/dL    RDW 12.7 11.9 - 14.6 %    Platelets 13 (LL) 209 - 450 K/uL    MPV Unable to calculate. Recommend adding IPF.  9.4 - 12.3 FL    nRBC 0.00 0.0 - 0.2 K/uL    Differential Type AUTOMATED      Seg Neutrophils 83 (H) 43 - 78 %    Lymphocytes 14 13 - 44 %    Monocytes 2 (L) 4.0 - 12.0 %    Eosinophils % 0 (L) 0.5 - 7.8 %    Basophils 0 0.0 - 2.0 %    Immature Granulocytes 0 0.0 - 5.0 %    Segs Absolute 4.0 1.7 - 8.2 K/UL    Absolute Lymph # 0.7 0.5 - 4.6 K/UL    Absolute Mono # 0.1 0.1 - 1.3 K/UL    Absolute Eos # 0.0 0.0 - 0.8 K/UL    Basophils Absolute 0.0 0.0 - 0.2 K/UL    Absolute Immature Granulocyte 0.0 0.0 - 0.5 K/UL   CBC with Auto Differential    Collection Time: 02/11/23  1:57 PM   Result Value Ref Range    WBC 6.1 4.3 - 11.1 K/uL    RBC 4.11 4.05 - 5.2 M/uL    Hemoglobin 11.1 (L) 11.7 - 15.4 g/dL    Hematocrit 32.6 (L) 35.8 - 46.3 %    MCV 79.3 (L) 82.0 - 102.0 FL    MCH 27.0 26.1 - 32.9 PG    MCHC 34.0 31.4 - 35.0 g/dL    RDW 12.7 11.9 - 14.6 %    Platelets 28 (LL) 061 - 450 K/uL    MPV Unable to calculate. Recommend adding IPF. 9.4 - 12.3 FL    nRBC 0.00 0.0 - 0.2 K/uL    Differential Type AUTOMATED      Seg Neutrophils 88 (H) 43 - 78 %    Lymphocytes 11 (L) 13 - 44 %    Monocytes 2 (L) 4.0 - 12.0 %    Eosinophils % 0 (L) 0.5 - 7.8 %    Basophils 0 0.0 - 2.0 %    Immature Granulocytes 0 0.0 - 5.0 %    Segs Absolute 5.4 1.7 - 8.2 K/UL    Absolute Lymph # 0.7 0.5 - 4.6 K/UL    Absolute Mono # 0.1 0.1 - 1.3 K/UL    Absolute Eos # 0.0 0.0 - 0.8 K/UL    Basophils Absolute 0.0 0.0 - 0.2 K/UL    Absolute Immature Granulocyte 0.0 0.0 - 0.5 K/UL       Imaging:  [email or web address]    ASSESSMENT/RECOMMENDATIONS:  [unfilled]  27-year-old female without past medical history other recent viral gastritis developed epistaxis and admitted for severe thrombocytopenia of 2, mild decrease of hemoglobin, needed response to platelet transfusion but consumes quickly, peripheral smear showed no schistocytes and no morphological abnormalities, most suspicious of ITP, arrange IVIG and start prednisone 60 mg daily, transfuse to keep platelet above 10. Today plt down but to 13, suggesting response to rx, continue prednisone and will follow tomorrow to decide whether to give more ivig and whether she can keep her trip to [facility] on Friday. Lab studies and imaging studies were personally reviewed. Pertinent old records were reviewed. Case discussed with primary team.    Thank you for allowing us to participate in the care of Ms. Liza Calvillo. Mendel Fuelling, M.D.   [address], [address] [place] [address]  Office : [phone]  Fax : [phone]

## 2023-02-12 VITALS
SYSTOLIC BLOOD PRESSURE: 110 MMHG | BODY MASS INDEX: 17.16 KG/M2 | WEIGHT: 100.5 LBS | HEIGHT: 64 IN | HEART RATE: 68 BPM | OXYGEN SATURATION: 98 % | TEMPERATURE: 98.1 F | DIASTOLIC BLOOD PRESSURE: 60 MMHG | RESPIRATION RATE: 18 BRPM

## 2023-02-12 PROBLEM — D69.3 ITP SECONDARY TO INFECTION (HCC): Status: ACTIVE | Noted: 2023-02-12

## 2023-02-12 PROBLEM — D61.818 PANCYTOPENIA (HCC): Status: RESOLVED | Noted: 2023-02-10 | Resolved: 2023-02-12

## 2023-02-12 PROBLEM — R04.0 EPISTAXIS: Status: RESOLVED | Noted: 2023-02-10 | Resolved: 2023-02-12

## 2023-02-12 LAB
ANION GAP SERPL CALC-SCNC: 4 MMOL/L (ref 2–11)
BASOPHILS # BLD: 0 K/UL (ref 0–0.2)
BASOPHILS NFR BLD: 0 % (ref 0–2)
BUN SERPL-MCNC: 15 MG/DL (ref 6–23)
CALCIUM SERPL-MCNC: 9 MG/DL (ref 8.3–10.4)
CHLORIDE SERPL-SCNC: 108 MMOL/L (ref 101–110)
CO2 SERPL-SCNC: 26 MMOL/L (ref 21–32)
CREAT SERPL-MCNC: 0.72 MG/DL (ref 0.6–1)
DIFFERENTIAL METHOD BLD: ABNORMAL
EOSINOPHIL # BLD: 0.1 K/UL (ref 0–0.8)
EOSINOPHIL NFR BLD: 1 % (ref 0.5–7.8)
ERYTHROCYTE [DISTWIDTH] IN BLOOD BY AUTOMATED COUNT: 13 % (ref 11.9–14.6)
GLUCOSE SERPL-MCNC: 98 MG/DL (ref 65–100)
HCT VFR BLD AUTO: 31.9 % (ref 35.8–46.3)
HGB BLD-MCNC: 10.7 G/DL (ref 11.7–15.4)
IMM GRANULOCYTES # BLD AUTO: 0 K/UL (ref 0–0.5)
IMM GRANULOCYTES NFR BLD AUTO: 0 % (ref 0–5)
LYMPHOCYTES # BLD: 3 K/UL (ref 0.5–4.6)
LYMPHOCYTES NFR BLD: 32 % (ref 13–44)
MAGNESIUM SERPL-MCNC: 2.2 MG/DL (ref 1.8–2.4)
MCH RBC QN AUTO: 27 PG (ref 26.1–32.9)
MCHC RBC AUTO-ENTMCNC: 33.5 G/DL (ref 31.4–35)
MCV RBC AUTO: 80.6 FL (ref 82–102)
MONOCYTES # BLD: 0.7 K/UL (ref 0.1–1.3)
MONOCYTES NFR BLD: 7 % (ref 4–12)
NEUTS SEG # BLD: 5.6 K/UL (ref 1.7–8.2)
NEUTS SEG NFR BLD: 59 % (ref 43–78)
NRBC # BLD: 0 K/UL (ref 0–0.2)
PLATELET # BLD AUTO: 66 K/UL (ref 150–450)
PMV BLD AUTO: 13.4 FL (ref 9.4–12.3)
POTASSIUM SERPL-SCNC: 3.4 MMOL/L (ref 3.5–5.1)
RBC # BLD AUTO: 3.96 M/UL (ref 4.05–5.2)
SODIUM SERPL-SCNC: 138 MMOL/L (ref 133–143)
WBC # BLD AUTO: 9.3 K/UL (ref 4.3–11.1)

## 2023-02-12 PROCEDURE — 80048 BASIC METABOLIC PNL TOTAL CA: CPT

## 2023-02-12 PROCEDURE — 6370000000 HC RX 637 (ALT 250 FOR IP): Performed by: FAMILY MEDICINE

## 2023-02-12 PROCEDURE — 6370000000 HC RX 637 (ALT 250 FOR IP): Performed by: INTERNAL MEDICINE

## 2023-02-12 PROCEDURE — 36415 COLL VENOUS BLD VENIPUNCTURE: CPT

## 2023-02-12 PROCEDURE — 83735 ASSAY OF MAGNESIUM: CPT

## 2023-02-12 PROCEDURE — 85025 COMPLETE CBC W/AUTO DIFF WBC: CPT

## 2023-02-12 RX ORDER — PSEUDOEPHEDRINE HCL 30 MG
100 TABLET ORAL 2 TIMES DAILY
Qty: 60 CAPSULE | Refills: 0 | Status: SHIPPED | OUTPATIENT
Start: 2023-02-12

## 2023-02-12 RX ORDER — PANTOPRAZOLE SODIUM 40 MG/1
40 TABLET, DELAYED RELEASE ORAL
Qty: 28 TABLET | Refills: 0 | Status: SHIPPED | OUTPATIENT
Start: 2023-02-12 | End: 2023-02-26

## 2023-02-12 RX ORDER — PREDNISONE 10 MG/1
TABLET ORAL
Qty: 140 TABLET | Refills: 0 | Status: SHIPPED | OUTPATIENT
Start: 2023-02-13 | End: 2023-03-27

## 2023-02-12 RX ORDER — POLYETHYLENE GLYCOL 3350 17 G/17G
17 POWDER, FOR SOLUTION ORAL DAILY
Qty: 527 G | Refills: 1 | Status: SHIPPED | OUTPATIENT
Start: 2023-02-13 | End: 2023-04-16

## 2023-02-12 RX ADMIN — PREDNISONE 50 MG: 50 TABLET ORAL at 08:20

## 2023-02-12 RX ADMIN — ACETAMINOPHEN 650 MG: 325 TABLET, FILM COATED ORAL at 08:27

## 2023-02-12 RX ADMIN — POLYETHYLENE GLYCOL 3350 17 G: 17 POWDER, FOR SOLUTION ORAL at 08:20

## 2023-02-12 RX ADMIN — ACETAMINOPHEN 650 MG: 325 TABLET, FILM COATED ORAL at 01:07

## 2023-02-12 RX ADMIN — DOCUSATE SODIUM 100 MG: 100 CAPSULE ORAL at 08:20

## 2023-02-12 RX ADMIN — PANTOPRAZOLE SODIUM 40 MG: 40 TABLET, DELAYED RELEASE ORAL at 05:09

## 2023-02-12 ASSESSMENT — PAIN SCALES - GENERAL: PAINLEVEL_OUTOF10: 5

## 2023-02-12 ASSESSMENT — PAIN DESCRIPTION - LOCATION: LOCATION: BACK;SHOULDER

## 2023-02-12 ASSESSMENT — PAIN DESCRIPTION - DESCRIPTORS: DESCRIPTORS: ACHING

## 2023-02-12 NOTE — CARE COORDINATION
Pt is for discharge home today with family and no needs/supportive care orders recieved for CM at this time. 02/12/23 1120   Social/Functional History   Lives With Family; Other (comment)  (Mom, sister)   Type of [address] None   ADL Assistance Independent   Mode of Transportation Car   Discharge Planning   Patient expects to be discharged to: [facility] Discharge   Transition of Care Consult (CM Consult) Discharge \[facility]\"" 1690 Discharge None   [employer] Information Provided? No   Mode of Transport at Discharge Other (see comment)  (family)   Confirm Follow Up Transport Self   Condition of Participation: Discharge Planning   The Plan for Transition of Care is related to the following treatment goals: Pt will return home with supportive family. The Patient and/Or Patient Representative agree with the Discharge Plan?  Yes

## 2023-02-12 NOTE — DISCHARGE SUMMARY
Hospitalist Discharge Summary   Admit Date:  2023  6:38 PM   DC Note date: 2023  Name:  Danyelle Espitia   Age:  23 y.o. Sex:  female  :  2000   MRN:  [id number]   Room:  [facility]  PCP:  None None    Presenting Complaint: Rash and Epistaxis       Problem List for this Hospitalization (present on admission):    Principal Problem:    Severe thrombocytopenia ([facility] [address]  Active Problems:    ITP secondary to infection [facility])  Resolved Problems:    Pancytopenia ([facility] [address]    Epistaxis      Hospital Course:  23year old female who presented to ED with epistaxis and rash on 23. She reported that over the previous 2 days, she had developed \"rashes\" on both legs. She has also been having intermittent nose bleeds as well. Of note, she was recently seen in the ER on 23 with abdominal pain and diarrhea, diagnosed with a viral gastroenteritis at that time which did not require admission. She reports some residual abdominal pain, but that has been improving over the past week. Upon ER evaluation, platelet count is 2. LDH is 247. Hgb is 12.3. Labs otherwise normal. She was found to have ITP with severe thrombocytopenia. She received platelet transfusion but her platelets kept dropping. Hematology started IVIG and Prednisone. Her platelet count started rising and staying elevated. She remained stable and was discharged home. Of note, she was flying to [facility] on  and said she would follow up with a doctor in [facility] then call for appt with Hematology once returned in March. Disposition: Home  Diet: ADULT DIET; Regular  Code Status: Full Code    Follow Ups:   Follow-up Information     Kiel Bergeron MD, MD Follow up in 5 week(s). Specialty: Hematology and Oncology  Contact information:  [address]  [address] [facility] [address]                       Time spent in patient discharge and coordination 39 minutes.       Follow up labs/diagnostics (ultimately defer to outpatient provider):  CBC in 2 weeks    Plan was discussed with patient, nursing, and case management. All questions answered. Patient was stable at time of discharge. Instructions given to call a physician or return if any concerns. Current Discharge Medication List        START taking these medications    Details   predniSONE (DELTASONE) 10 MG tablet Take 5 tablets by mouth daily for 14 days, THEN 4 tablets daily for 7 days, THEN 3 tablets daily for 7 days, THEN 2 tablets daily for 7 days, THEN 1 tablet daily for 7 days. Qty: 140 tablet, Refills: 0      docusate sodium (COLACE, DULCOLAX) 100 MG CAPS Take 100 mg by mouth 2 times daily  Qty: 60 capsule, Refills: 0      polyethylene glycol (GLYCOLAX) 17 g packet Take 17 g by mouth daily  Qty: 527 g, Refills: 1      pantoprazole (PROTONIX) 40 MG tablet Take 1 tablet by mouth 2 times daily (before meals) for 14 days  Qty: 28 tablet, Refills: 0             Procedures done this admission:  * No surgery found *    Consults this admission:  IP CONSULT TO ONCOLOGY    Echocardiogram results:  No results found for this or any previous visit. Diagnostic Imaging/Tests:   XR ACUTE ABD SERIES CHEST 1 VW    Result Date: 2/9/2023  No acute cardiopulmonary process. Moderate amount of retained stool in the colon. Nonobstructive bowel gas pattern Elian  . Evgeny Ramos M.D. 2/9/2023 7:35:00 PM    CT ABDOMEN PELVIS W IV CONTRAST Additional Contrast? Oral    Result Date: 2/4/2023  The entire colon and rectum is filled with low density fluid. This can be seen with diarrhea. Otherwise grossly unremarkable examination of the abdomen and pelvis. The kidneys show symmetric enhancement. Hyperdensity noted throughout the pyramids suggests imaging during the early excretory phase.        Labs: Results:       BMP, Mg, Phos Recent Labs     02/10/23  0606 02/11/23  0603 02/12/23  0555    137 138   K 4.0 4.3 3.4*    108 108   CO2 26 24 26   ANIONGAP 4 5 4   BUN 9 11 15   CREATININE 0. 62 0.66 0.72   LABGLOM >60 >60 >60   CALCIUM 9.1 9.2 9.0   GLUCOSE 101* 140* 98   MG  --   --  2.2      CBC Recent Labs     02/11/23  1357 02/11/23  2206 02/12/23  0555   WBC 6.1 10.5 9.3   RBC 4.11 4.17 3.96*   HGB 11.1* 11.2* 10.7*   HCT 32.6* 33.3* 31.9*   MCV 79.3* 79.9* 80.6*   MCH 27.0 26.9 27.0   MCHC 34.0 33.6 33.5   RDW 12.7 12.9 13.0   PLT 28* 54* 66*   MPV Unable to calculate. Recommend adding IPF. Unable to calculate. Recommend adding IPF. 13.4*   NRBC 0.00 0.00 0.00   SEGS 88* 77 59   LYMPHOPCT 11* 16 32   EOSRELPCT 0* 0* 1   MONOPCT 2* 6 7   BASOPCT 0 0 0   IMMGRAN 0 1 0   SEGSABS 5.4 8.0 5.6   LYMPHSABS 0.7 1.7 3.0   EOSABS 0.0 0.0 0.1   MONOSABS 0.1 0.7 0.7   BASOSABS 0.0 0.0 0.0   ABSIMMGRAN 0.0 0.1 0.0      LFT Recent Labs     02/09/23  1903   BILITOT 0.7   ALKPHOS 68   AST 19   ALT 23   PROT 8.6*   LABALBU 3.9   GLOB 4.7*      Cardiac  Lab Results   Component Value Date/Time    TROPHS 4.0 02/09/2023 07:03 PM      Coags Lab Results   Component Value Date/Time    PROTIME 13.4 02/10/2023 07:28 AM    PROTIME 12.4 02/09/2023 07:02 PM    INR 1.0 02/10/2023 07:28 AM    INR 0.9 02/09/2023 07:02 PM    APTT 75.0 02/10/2023 07:28 AM      A1c No results found for: LABA1C, EAG   Lipids No results found for: CHOL, LDLCALC, LABVLDL, HDL, CHOLHDLRATIO, TRIG   Thyroid  No results found for: Adra Post     Most Recent UA No results found for: COLORU, APPEARANCE, SPECGRAV, LABPH, PROTEINU, GLUCOSEU, KETUA, BILIRUBINUR, BLOODU, UROBILINOGEN, NITRU, LEUKOCYTESUR, WBCUA, RBCUA, EPITHUA, BACTERIA, LABCAST, MUCUS     No results for input(s): CULTURE in the last 720 hours.     All Labs from Last 24 Hrs:  Recent Results (from the past 24 hour(s))   CBC with Auto Differential    Collection Time: 02/11/23  1:57 PM   Result Value Ref Range    WBC 6.1 4.3 - 11.1 K/uL    RBC 4.11 4.05 - 5.2 M/uL    Hemoglobin 11.1 (L) 11.7 - 15.4 g/dL    Hematocrit 32.6 (L) 35.8 - 46.3 %    MCV 79.3 (L) 82.0 - 102.0 FL    MCH 27.0 26.1 - 32.9 PG    MCHC 34.0 31.4 - 35.0 g/dL    RDW 12.7 11.9 - 14.6 %    Platelets 28 (LL) 616 - 450 K/uL    MPV Unable to calculate. Recommend adding IPF. 9.4 - 12.3 FL    nRBC 0.00 0.0 - 0.2 K/uL    Differential Type AUTOMATED      Seg Neutrophils 88 (H) 43 - 78 %    Lymphocytes 11 (L) 13 - 44 %    Monocytes 2 (L) 4.0 - 12.0 %    Eosinophils % 0 (L) 0.5 - 7.8 %    Basophils 0 0.0 - 2.0 %    Immature Granulocytes 0 0.0 - 5.0 %    Segs Absolute 5.4 1.7 - 8.2 K/UL    Absolute Lymph # 0.7 0.5 - 4.6 K/UL    Absolute Mono # 0.1 0.1 - 1.3 K/UL    Absolute Eos # 0.0 0.0 - 0.8 K/UL    Basophils Absolute 0.0 0.0 - 0.2 K/UL    Absolute Immature Granulocyte 0.0 0.0 - 0.5 K/UL   CBC with Auto Differential    Collection Time: 02/11/23 10:06 PM   Result Value Ref Range    WBC 10.5 4.3 - 11.1 K/uL    RBC 4.17 4.05 - 5.2 M/uL    Hemoglobin 11.2 (L) 11.7 - 15.4 g/dL    Hematocrit 33.3 (L) 35.8 - 46.3 %    MCV 79.9 (L) 82.0 - 102.0 FL    MCH 26.9 26.1 - 32.9 PG    MCHC 33.6 31.4 - 35.0 g/dL    RDW 12.9 11.9 - 14.6 %    Platelets 54 (L) 529 - 450 K/uL    MPV Unable to calculate. Recommend adding IPF.  9.4 - 12.3 FL    nRBC 0.00 0.0 - 0.2 K/uL    Differential Type AUTOMATED      Seg Neutrophils 77 43 - 78 %    Lymphocytes 16 13 - 44 %    Monocytes 6 4.0 - 12.0 %    Eosinophils % 0 (L) 0.5 - 7.8 %    Basophils 0 0.0 - 2.0 %    Immature Granulocytes 1 0.0 - 5.0 %    Segs Absolute 8.0 1.7 - 8.2 K/UL    Absolute Lymph # 1.7 0.5 - 4.6 K/UL    Absolute Mono # 0.7 0.1 - 1.3 K/UL    Absolute Eos # 0.0 0.0 - 0.8 K/UL    Basophils Absolute 0.0 0.0 - 0.2 K/UL    Absolute Immature Granulocyte 0.1 0.0 - 0.5 K/UL   Basic Metabolic Panel w/ Reflex to MG    Collection Time: 02/12/23  5:55 AM   Result Value Ref Range    Sodium 138 133 - 143 mmol/L    Potassium 3.4 (L) 3.5 - 5.1 mmol/L    Chloride 108 101 - 110 mmol/L    CO2 26 21 - 32 mmol/L    Anion Gap 4 2 - 11 mmol/L    Glucose 98 65 - 100 mg/dL    BUN 15 6 - 23 MG/DL    Creatinine 0.72 0.6 - 1.0 MG/DL Est, Glom Filt Rate >60 >60 ml/min/1.73m2    Calcium 9.0 8.3 - 10.4 MG/DL   CBC with Auto Differential    Collection Time: 02/12/23  5:55 AM   Result Value Ref Range    WBC 9.3 4.3 - 11.1 K/uL    RBC 3.96 (L) 4.05 - 5.2 M/uL    Hemoglobin 10.7 (L) 11.7 - 15.4 g/dL    Hematocrit 31.9 (L) 35.8 - 46.3 %    MCV 80.6 (L) 82.0 - 102.0 FL    MCH 27.0 26.1 - 32.9 PG    MCHC 33.5 31.4 - 35.0 g/dL    RDW 13.0 11.9 - 14.6 %    Platelets 66 (L) 009 - 450 K/uL    MPV 13.4 (H) 9.4 - 12.3 FL    nRBC 0.00 0.0 - 0.2 K/uL    Differential Type AUTOMATED      Seg Neutrophils 59 43 - 78 %    Lymphocytes 32 13 - 44 %    Monocytes 7 4.0 - 12.0 %    Eosinophils % 1 0.5 - 7.8 %    Basophils 0 0.0 - 2.0 %    Immature Granulocytes 0 0.0 - 5.0 %    Segs Absolute 5.6 1.7 - 8.2 K/UL    Absolute Lymph # 3.0 0.5 - 4.6 K/UL    Absolute Mono # 0.7 0.1 - 1.3 K/UL    Absolute Eos # 0.1 0.0 - 0.8 K/UL    Basophils Absolute 0.0 0.0 - 0.2 K/UL    Absolute Immature Granulocyte 0.0 0.0 - 0.5 K/UL   Magnesium    Collection Time: 02/12/23  5:55 AM   Result Value Ref Range    Magnesium 2.2 1.8 - 2.4 mg/dL       No Known Allergies    There is no immunization history on file for this patient. Recent Vital Data:  Patient Vitals for the past 24 hrs:   Temp Pulse Resp BP SpO2   02/12/23 0739 98.1 °F (36.7 °C) 68 18 110/60 98 %   02/12/23 0428 97.7 °F (36.5 °C) 68 14 113/60 98 %   02/11/23 2333 98.4 °F (36.9 °C) 61 16 116/69 99 %   02/11/23 1955 97.9 °F (36.6 °C) 72 16 110/72 99 %   02/11/23 1526 98.2 °F (36.8 °C) 79 16 113/75 --       Oxygen Therapy  SpO2: 98 %  Pulse Oximeter Device Mode: Intermittent  O2 Device: None (Room air)    Estimated body mass index is 17.25 kg/m² as calculated from the following:    Height as of this encounter: 5' 4\" (1.626 m). Weight as of this encounter: 100 lb 8 oz (45.6 kg).     Intake/Output Summary (Last 24 hours) at 2/12/2023 1344  Last data filed at 2/12/2023 0851  Gross per 24 hour   Intake 890 ml   Output --   Net 890 ml Physical Exam:  General:    Well nourished. No overt distress  Head:  Normocephalic, atraumatic  Eyes:  Sclerae appear normal.  Pupils equally round. HENT:  Nares appear normal, no drainage. Moist mucous membranes  Neck:  No restricted ROM. Trachea midline  CV:   RRR. No m/r/g. No JVD  Lungs:   CTAB. No wheezing, rhonchi, or rales. Respirations even, unlabored  Abdomen:   Soft, nontender, nondistended. Extremities: Warm and dry. No cyanosis or clubbing. No edema. Skin:     Petechiae on legs  Neuro:  CN II-XII grossly intact. Psych:  Normal mood and affect.     Signed:  Doug Espana DO  2/12/2023

## 2023-02-12 NOTE — PROGRESS NOTES
[facility]   [address] [facility] [address]  Phone: [phone]             February 12, 2023    Patient: Juliann Herrera   YOB: 2000   Date of Visit: 2/9/2023       To Whom It May Concern:    Ronaldo Goldsmith was admitted and treated in our facility 2/9/2023-2/12/2023. She was ill a week before and could not attend classes or school work. Please excuse her from all activities and work during this time if possible.       Sincerely,         Jane Khoury DO

## 2023-02-13 ENCOUNTER — TELEPHONE (OUTPATIENT)
Dept: INTERNAL MEDICINE CLINIC | Facility: CLINIC | Age: 23
End: 2023-02-13

## 2023-02-13 NOTE — TELEPHONE ENCOUNTER
Patient returned call and states she will get her follow up appts in [facility].   is flying on 2/17 and said she would follow up with a doctor in [facility] then call for appt with Hematology once returned in March.

## 2023-02-14 LAB
ALBUMIN SERPL ELPH-MCNC: 4 G/DL (ref 2.9–4.4)
ALBUMIN/GLOB SERPL: 1.3 (ref 0.7–1.7)
ALPHA1 GLOB SERPL ELPH-MCNC: 0.2 G/DL (ref 0–0.4)
ALPHA2 GLOB SERPL ELPH-MCNC: 0.8 G/DL (ref 0.4–1)
B-GLOBULIN SERPL ELPH-MCNC: 0.8 G/DL (ref 0.7–1.3)
GAMMA GLOB SERPL ELPH-MCNC: 1.5 G/DL (ref 0.4–1.8)
GLOBULIN SER-MCNC: 3.3 G/DL (ref 2.2–3.9)
IGA SERPL-MCNC: 324 MG/DL (ref 87–352)
IGG SERPL-MCNC: 1601 MG/DL (ref 586–1602)
IGM SERPL-MCNC: 209 MG/DL (ref 26–217)
INTERPRETATION SERPL IEP-IMP: ABNORMAL
M PROTEIN SERPL ELPH-MCNC: ABNORMAL G/DL
PROT SERPL-MCNC: 7.3 G/DL (ref 6–8.5)

## 2023-03-22 DIAGNOSIS — D69.6 SEVERE THROMBOCYTOPENIA (HCC): Primary | ICD-10-CM

## 2023-03-27 ENCOUNTER — HOSPITAL ENCOUNTER (OUTPATIENT)
Dept: LAB | Age: 23
Discharge: HOME | End: 2023-03-30
Payer: COMMERCIAL

## 2023-03-27 ENCOUNTER — OFFICE VISIT (OUTPATIENT)
Dept: ONCOLOGY | Age: 23
End: 2023-03-27
Payer: COMMERCIAL

## 2023-03-27 VITALS
SYSTOLIC BLOOD PRESSURE: 108 MMHG | WEIGHT: 104 LBS | HEART RATE: 94 BPM | DIASTOLIC BLOOD PRESSURE: 66 MMHG | RESPIRATION RATE: 16 BRPM | HEIGHT: 64 IN | BODY MASS INDEX: 17.75 KG/M2 | TEMPERATURE: 98.1 F | OXYGEN SATURATION: 95 %

## 2023-03-27 DIAGNOSIS — D64.9 ANEMIA, UNSPECIFIED TYPE: ICD-10-CM

## 2023-03-27 DIAGNOSIS — D69.6 SEVERE THROMBOCYTOPENIA (HCC): ICD-10-CM

## 2023-03-27 DIAGNOSIS — D69.3 ITP SECONDARY TO INFECTION (HCC): Primary | ICD-10-CM

## 2023-03-27 LAB
ALBUMIN SERPL-MCNC: 3.9 G/DL (ref 3.5–5)
ALBUMIN/GLOB SERPL: 0.9 (ref 0.4–1.6)
ALP SERPL-CCNC: 53 U/L (ref 50–136)
ALT SERPL-CCNC: 20 U/L (ref 12–65)
ANION GAP SERPL CALC-SCNC: 2 MMOL/L (ref 2–11)
AST SERPL-CCNC: 14 U/L (ref 15–37)
BASOPHILS # BLD: 0 K/UL (ref 0–0.2)
BASOPHILS NFR BLD: 0 % (ref 0–2)
BILIRUB SERPL-MCNC: 0.5 MG/DL (ref 0.2–1.1)
BUN SERPL-MCNC: 13 MG/DL (ref 6–23)
CALCIUM SERPL-MCNC: 9.5 MG/DL (ref 8.3–10.4)
CHLORIDE SERPL-SCNC: 109 MMOL/L (ref 101–110)
CO2 SERPL-SCNC: 27 MMOL/L (ref 21–32)
CREAT SERPL-MCNC: 0.8 MG/DL (ref 0.6–1)
DIFFERENTIAL METHOD BLD: ABNORMAL
EOSINOPHIL # BLD: 0.1 K/UL (ref 0–0.8)
EOSINOPHIL NFR BLD: 2 % (ref 0.5–7.8)
ERYTHROCYTE [DISTWIDTH] IN BLOOD BY AUTOMATED COUNT: 13.8 % (ref 11.9–14.6)
GLOBULIN SER CALC-MCNC: 4.2 G/DL (ref 2.8–4.5)
GLUCOSE SERPL-MCNC: 101 MG/DL (ref 65–100)
HCT VFR BLD AUTO: 43.9 % (ref 35.8–46.3)
HGB BLD-MCNC: 14.3 G/DL (ref 11.7–15.4)
IMM GRANULOCYTES # BLD AUTO: 0 K/UL (ref 0–0.5)
IMM GRANULOCYTES NFR BLD AUTO: 0 % (ref 0–5)
LYMPHOCYTES # BLD: 3.2 K/UL (ref 0.5–4.6)
LYMPHOCYTES NFR BLD: 48 % (ref 13–44)
MCH RBC QN AUTO: 27.9 PG (ref 26.1–32.9)
MCHC RBC AUTO-ENTMCNC: 32.6 G/DL (ref 31.4–35)
MCV RBC AUTO: 85.6 FL (ref 82–102)
MONOCYTES # BLD: 0.5 K/UL (ref 0.1–1.3)
MONOCYTES NFR BLD: 7 % (ref 4–12)
NEUTS SEG # BLD: 2.9 K/UL (ref 1.7–8.2)
NEUTS SEG NFR BLD: 43 % (ref 43–78)
NRBC # BLD: 0 K/UL (ref 0–0.2)
PLATELET # BLD AUTO: 75 K/UL (ref 150–450)
PLATELET # BLD AUTO: 79 K/UL (ref 150–450)
PMV BLD AUTO: 13.3 FL (ref 9.4–12.3)
POTASSIUM SERPL-SCNC: 3.6 MMOL/L (ref 3.5–5.1)
PROT SERPL-MCNC: 8.1 G/DL (ref 6.3–8.2)
RBC # BLD AUTO: 5.13 M/UL (ref 4.05–5.2)
SODIUM SERPL-SCNC: 138 MMOL/L (ref 133–143)
WBC # BLD AUTO: 6.7 K/UL (ref 4.3–11.1)

## 2023-03-27 PROCEDURE — 85025 COMPLETE CBC W/AUTO DIFF WBC: CPT

## 2023-03-27 PROCEDURE — 36415 COLL VENOUS BLD VENIPUNCTURE: CPT

## 2023-03-27 PROCEDURE — 99214 OFFICE O/P EST MOD 30 MIN: CPT | Performed by: INTERNAL MEDICINE

## 2023-03-27 PROCEDURE — 85049 AUTOMATED PLATELET COUNT: CPT

## 2023-03-27 PROCEDURE — 80053 COMPREHEN METABOLIC PANEL: CPT

## 2023-03-27 ASSESSMENT — PATIENT HEALTH QUESTIONNAIRE - PHQ9
1. LITTLE INTEREST OR PLEASURE IN DOING THINGS: 0
2. FEELING DOWN, DEPRESSED OR HOPELESS: 0
SUM OF ALL RESPONSES TO PHQ QUESTIONS 1-9: 0
SUM OF ALL RESPONSES TO PHQ9 QUESTIONS 1 & 2: 0

## 2023-03-27 NOTE — PROGRESS NOTES
nervous/anxious. No Known Allergies  History reviewed. No pertinent past medical history. History reviewed. No pertinent surgical history. History reviewed. No pertinent family history. Social History     Socioeconomic History    Marital status: Single     Spouse name: Not on file    Number of children: Not on file    Years of education: Not on file    Highest education level: Not on file   Occupational History    Not on file   Tobacco Use    Smoking status: Never    Smokeless tobacco: Never   Substance and Sexual Activity    Alcohol use: Never    Drug use: Never    Sexual activity: Not on file   Other Topics Concern    Not on file   Social History Narrative    Not on file     Social Determinants of Health     Financial Resource Strain: Not on file   Food Insecurity: Not on file   Transportation Needs: Not on file   Physical Activity: Not on file   Stress: Not on file   Social Connections: Not on file   Intimate Partner Violence: Not on file   Housing Stability: Not on file     Current Outpatient Medications   Medication Sig Dispense Refill    predniSONE (DELTASONE) 10 MG tablet Take 5 tablets by mouth daily for 14 days, THEN 4 tablets daily for 7 days, THEN 3 tablets daily for 7 days, THEN 2 tablets daily for 7 days, THEN 1 tablet daily for 7 days. 140 tablet 0    docusate sodium (COLACE, DULCOLAX) 100 MG CAPS Take 100 mg by mouth 2 times daily 60 capsule 0    polyethylene glycol (GLYCOLAX) 17 g packet Take 17 g by mouth daily 527 g 1    pantoprazole (PROTONIX) 40 MG tablet Take 1 tablet by mouth 2 times daily (before meals) for 14 days 28 tablet 0     No current facility-administered medications for this visit. OBJECTIVE:  /66 (Site: Right Upper Arm, Position: Sitting)   Pulse 94   Temp 98.1 °F (36.7 °C) (Oral)   Resp 16   Ht 5' 4.17\" (1.63 m)   Wt 104 lb (47.2 kg)   SpO2 95%   BMI 17.76 kg/m²     Physical Exam:  Constitutional: Oriented to person, place, and time.    Well-developed and

## 2023-03-27 NOTE — PATIENT INSTRUCTIONS
any of the above symptoms. Patient does express an interest in My Chart. My Chart log in information explained on the after visit summary printout at the [facility] desk.     Danielle Matos RN

## 2023-04-14 ENCOUNTER — HOSPITAL ENCOUNTER (OUTPATIENT)
Dept: LAB | Age: 23
Discharge: HOME | End: 2023-04-17
Payer: COMMERCIAL

## 2023-04-14 ENCOUNTER — HOSPITAL ENCOUNTER (INPATIENT)
Age: 23
LOS: 1 days | Discharge: HOME | DRG: 813 | End: 2023-04-15
Attending: FAMILY MEDICINE | Admitting: FAMILY MEDICINE
Payer: COMMERCIAL

## 2023-04-14 ENCOUNTER — HOSPITAL ENCOUNTER (EMERGENCY)
Age: 23
Discharge: ANOTHER HOSPITAL | End: 2023-04-14
Attending: EMERGENCY MEDICINE
Payer: COMMERCIAL

## 2023-04-14 VITALS
OXYGEN SATURATION: 99 % | RESPIRATION RATE: 16 BRPM | HEIGHT: 64 IN | BODY MASS INDEX: 17.07 KG/M2 | WEIGHT: 100 LBS | DIASTOLIC BLOOD PRESSURE: 76 MMHG | TEMPERATURE: 98.2 F | SYSTOLIC BLOOD PRESSURE: 113 MMHG | HEART RATE: 65 BPM

## 2023-04-14 DIAGNOSIS — D69.3 ITP SECONDARY TO INFECTION (HCC): ICD-10-CM

## 2023-04-14 DIAGNOSIS — D69.6 SEVERE THROMBOCYTOPENIA (HCC): ICD-10-CM

## 2023-04-14 DIAGNOSIS — D69.3 ITP SECONDARY TO INFECTION (HCC): Primary | ICD-10-CM

## 2023-04-14 LAB
ABO + RH BLD: NORMAL
ALBUMIN SERPL-MCNC: 3.8 G/DL (ref 3.5–5)
ALBUMIN/GLOB SERPL: 0.9 (ref 0.4–1.6)
ALP SERPL-CCNC: 57 U/L (ref 50–136)
ALT SERPL-CCNC: 27 U/L (ref 12–65)
ANION GAP SERPL CALC-SCNC: 3 MMOL/L (ref 2–11)
ANTIGENS PRESENT BLD: NORMAL
AST SERPL-CCNC: 18 U/L (ref 15–37)
BASOPHILS # BLD: 0 K/UL (ref 0–0.2)
BASOPHILS NFR BLD: 0 % (ref 0–2)
BILIRUB SERPL-MCNC: 0.5 MG/DL (ref 0.2–1.1)
BLOOD BANK CMNT PATIENT-IMP: NORMAL
BLOOD GROUP ANTIBODIES SERPL: NORMAL
BLOOD GROUP ANTIBODIES SERPL: NORMAL
BUN SERPL-MCNC: 15 MG/DL (ref 6–23)
CALCIUM SERPL-MCNC: 9.1 MG/DL (ref 8.3–10.4)
CHLORIDE SERPL-SCNC: 109 MMOL/L (ref 101–110)
CO2 SERPL-SCNC: 24 MMOL/L (ref 21–32)
CREAT SERPL-MCNC: 0.67 MG/DL (ref 0.6–1)
DIFFERENTIAL METHOD BLD: ABNORMAL
EOSINOPHIL # BLD: 0.1 K/UL (ref 0–0.8)
EOSINOPHIL NFR BLD: 1 % (ref 0.5–7.8)
EOSINOPHIL NFR BLD: 1 % (ref 0.5–7.8)
EOSINOPHIL NFR BLD: 2 % (ref 0.5–7.8)
ERYTHROCYTE [DISTWIDTH] IN BLOOD BY AUTOMATED COUNT: 12.7 % (ref 11.9–14.6)
GLOBULIN SER CALC-MCNC: 4.2 G/DL (ref 2.8–4.5)
GLUCOSE SERPL-MCNC: 103 MG/DL (ref 65–100)
HCT VFR BLD AUTO: 33.9 % (ref 35.8–46.3)
HCT VFR BLD AUTO: 41.1 % (ref 35.8–46.3)
HCT VFR BLD AUTO: 41.5 % (ref 35.8–46.3)
HGB BLD-MCNC: 11.9 G/DL (ref 11.7–15.4)
HGB BLD-MCNC: 14 G/DL (ref 11.7–15.4)
HGB BLD-MCNC: 14.1 G/DL (ref 11.7–15.4)
IMM GRANULOCYTES # BLD AUTO: 0 K/UL (ref 0–0.5)
IMM GRANULOCYTES NFR BLD AUTO: 0 % (ref 0–5)
LYMPHOCYTES # BLD: 1.8 K/UL (ref 0.5–4.6)
LYMPHOCYTES # BLD: 2.2 K/UL (ref 0.5–4.6)
LYMPHOCYTES # BLD: 2.3 K/UL (ref 0.5–4.6)
LYMPHOCYTES NFR BLD: 34 % (ref 13–44)
LYMPHOCYTES NFR BLD: 36 % (ref 13–44)
LYMPHOCYTES NFR BLD: 43 % (ref 13–44)
MCH RBC QN AUTO: 28.1 PG (ref 26.1–32.9)
MCH RBC QN AUTO: 28.1 PG (ref 26.1–32.9)
MCH RBC QN AUTO: 28.4 PG (ref 26.1–32.9)
MCHC RBC AUTO-ENTMCNC: 33.7 G/DL (ref 31.4–35)
MCHC RBC AUTO-ENTMCNC: 34.3 G/DL (ref 31.4–35)
MCHC RBC AUTO-ENTMCNC: 35.1 G/DL (ref 31.4–35)
MCV RBC AUTO: 80.9 FL (ref 82–102)
MCV RBC AUTO: 82 FL (ref 82–102)
MCV RBC AUTO: 83.2 FL (ref 82–102)
MONOCYTES # BLD: 0.3 K/UL (ref 0.1–1.3)
MONOCYTES # BLD: 0.3 K/UL (ref 0.1–1.3)
MONOCYTES # BLD: 0.4 K/UL (ref 0.1–1.3)
MONOCYTES NFR BLD: 5 % (ref 4–12)
MONOCYTES NFR BLD: 6 % (ref 4–12)
MONOCYTES NFR BLD: 7 % (ref 4–12)
NEUTS SEG # BLD: 2.6 K/UL (ref 1.7–8.2)
NEUTS SEG # BLD: 3.1 K/UL (ref 1.7–8.2)
NEUTS SEG # BLD: 3.5 K/UL (ref 1.7–8.2)
NEUTS SEG NFR BLD: 48 % (ref 43–78)
NEUTS SEG NFR BLD: 58 % (ref 43–78)
NEUTS SEG NFR BLD: 59 % (ref 43–78)
NRBC # BLD: 0 K/UL (ref 0–0.2)
PLATELET # BLD AUTO: 0 K/UL (ref 150–450)
PLATELET # BLD AUTO: 1 K/UL (ref 150–450)
PLATELET # BLD AUTO: 25 K/UL (ref 150–450)
PLATELET COMMENT: ABNORMAL
PMV BLD AUTO: 9.1 FL (ref 9.4–12.3)
PMV BLD AUTO: ABNORMAL FL (ref 9.4–12.3)
PMV BLD AUTO: ABNORMAL FL (ref 9.4–12.3)
POTASSIUM SERPL-SCNC: 3.6 MMOL/L (ref 3.5–5.1)
PROT SERPL-MCNC: 8 G/DL (ref 6.3–8.2)
RBC # BLD AUTO: 4.19 M/UL (ref 4.05–5.2)
RBC # BLD AUTO: 4.99 M/UL (ref 4.05–5.2)
RBC # BLD AUTO: 5.01 M/UL (ref 4.05–5.2)
RBC MORPH BLD: ABNORMAL
SODIUM SERPL-SCNC: 136 MMOL/L (ref 133–143)
SPECIMEN EXP DATE BLD: NORMAL
WBC # BLD AUTO: 5.3 K/UL (ref 4.3–11.1)
WBC # BLD AUTO: 5.4 K/UL (ref 4.3–11.1)
WBC # BLD AUTO: 6.1 K/UL (ref 4.3–11.1)
WBC MORPH BLD: ABNORMAL

## 2023-04-14 PROCEDURE — 1100000000 HC RM PRIVATE

## 2023-04-14 PROCEDURE — 99285 EMERGENCY DEPT VISIT HI MDM: CPT

## 2023-04-14 PROCEDURE — 86901 BLOOD TYPING SEROLOGIC RH(D): CPT

## 2023-04-14 PROCEDURE — 86905 BLOOD TYPING RBC ANTIGENS: CPT

## 2023-04-14 PROCEDURE — 36415 COLL VENOUS BLD VENIPUNCTURE: CPT

## 2023-04-14 PROCEDURE — 85025 COMPLETE CBC W/AUTO DIFF WBC: CPT

## 2023-04-14 PROCEDURE — 80053 COMPREHEN METABOLIC PANEL: CPT

## 2023-04-14 PROCEDURE — 86870 RBC ANTIBODY IDENTIFICATION: CPT

## 2023-04-14 PROCEDURE — P9035 PLATELET PHERES LEUKOREDUCED: HCPCS

## 2023-04-14 RX ORDER — ACETAMINOPHEN 325 MG/1
650 TABLET ORAL EVERY 6 HOURS PRN
Status: DISCONTINUED | OUTPATIENT
Start: 2023-04-14 | End: 2023-04-15 | Stop reason: HOSPADM

## 2023-04-14 RX ORDER — SODIUM CHLORIDE 9 MG/ML
INJECTION, SOLUTION INTRAVENOUS PRN
Status: DISCONTINUED | OUTPATIENT
Start: 2023-04-14 | End: 2023-04-15 | Stop reason: HOSPADM

## 2023-04-14 RX ORDER — POLYETHYLENE GLYCOL 3350 17 G/17G
17 POWDER, FOR SOLUTION ORAL DAILY PRN
Status: DISCONTINUED | OUTPATIENT
Start: 2023-04-14 | End: 2023-04-15 | Stop reason: HOSPADM

## 2023-04-14 RX ORDER — ACETAMINOPHEN 650 MG/1
650 SUPPOSITORY RECTAL EVERY 6 HOURS PRN
Status: DISCONTINUED | OUTPATIENT
Start: 2023-04-14 | End: 2023-04-15 | Stop reason: HOSPADM

## 2023-04-14 RX ORDER — SODIUM CHLORIDE 0.9 % (FLUSH) 0.9 %
5-40 SYRINGE (ML) INJECTION PRN
Status: DISCONTINUED | OUTPATIENT
Start: 2023-04-14 | End: 2023-04-15 | Stop reason: HOSPADM

## 2023-04-14 RX ORDER — SODIUM CHLORIDE 9 MG/ML
INJECTION, SOLUTION INTRAVENOUS PRN
Status: DISCONTINUED | OUTPATIENT
Start: 2023-04-14 | End: 2023-04-14 | Stop reason: HOSPADM

## 2023-04-14 RX ORDER — SODIUM CHLORIDE 0.9 % (FLUSH) 0.9 %
5-40 SYRINGE (ML) INJECTION EVERY 12 HOURS SCHEDULED
Status: DISCONTINUED | OUTPATIENT
Start: 2023-04-14 | End: 2023-04-15 | Stop reason: HOSPADM

## 2023-04-14 RX ORDER — ONDANSETRON 4 MG/1
4 TABLET, ORALLY DISINTEGRATING ORAL EVERY 8 HOURS PRN
Status: DISCONTINUED | OUTPATIENT
Start: 2023-04-14 | End: 2023-04-15 | Stop reason: HOSPADM

## 2023-04-14 RX ORDER — ONDANSETRON 2 MG/ML
4 INJECTION INTRAMUSCULAR; INTRAVENOUS EVERY 6 HOURS PRN
Status: DISCONTINUED | OUTPATIENT
Start: 2023-04-14 | End: 2023-04-15 | Stop reason: HOSPADM

## 2023-04-14 RX ORDER — PANTOPRAZOLE SODIUM 40 MG/1
40 TABLET, DELAYED RELEASE ORAL
Status: DISCONTINUED | OUTPATIENT
Start: 2023-04-15 | End: 2023-04-15 | Stop reason: HOSPADM

## 2023-04-14 ASSESSMENT — ENCOUNTER SYMPTOMS
VOMITING: 0
CHEST TIGHTNESS: 0
ABDOMINAL PAIN: 0
COLOR CHANGE: 0
SHORTNESS OF BREATH: 0
NAUSEA: 0
DIARRHEA: 0

## 2023-04-14 ASSESSMENT — PAIN SCALES - GENERAL
PAINLEVEL_OUTOF10: 0
PAINLEVEL_OUTOF10: 0

## 2023-04-14 ASSESSMENT — PAIN - FUNCTIONAL ASSESSMENT: PAIN_FUNCTIONAL_ASSESSMENT: 0-10

## 2023-04-14 NOTE — CONSENT
Informed Consent for Blood Component Transfusion Note    I have discussed with the patient the rationale for blood component transfusion; its benefits in treating or preventing fatigue, organ damage, or death; and its risk which includes mild transfusion reactions, rare risk of blood borne infection, or more serious but rare reactions. I have discussed the alternatives to transfusion, including the risk and consequences of not receiving transfusion. The patient had an opportunity to ask questions and had agreed to proceed with transfusion of blood components.     Electronically signed by MARIIA Lake on 4/14/23 at 6:02 PM EDT

## 2023-04-14 NOTE — ED PROVIDER NOTES
Smokeless tobacco: Never   Substance and Sexual Activity    Alcohol use: Never    Drug use: Never        Previous Medications    DOCUSATE SODIUM (COLACE, DULCOLAX) 100 MG CAPS    Take 100 mg by mouth 2 times daily    PANTOPRAZOLE (PROTONIX) 40 MG TABLET    Take 1 tablet by mouth 2 times daily (before meals) for 14 days    POLYETHYLENE GLYCOL (GLYCOLAX) 17 G PACKET    Take 17 g by mouth daily        Results for orders placed or performed during the hospital encounter of 04/14/23   CBC with Auto Differential   Result Value Ref Range    WBC 6.1 4.3 - 11.1 K/uL    RBC 5.01 4.05 - 5.2 M/uL    Hemoglobin 14.1 11.7 - 15.4 g/dL    Hematocrit 41.1 35.8 - 46.3 %    MCV 82.0 82.0 - 102.0 FL    MCH 28.1 26.1 - 32.9 PG    MCHC 34.3 31.4 - 35.0 g/dL    RDW 12.7 11.9 - 14.6 %    Platelets 1 (LL) 602 - 450 K/uL    MPV Unable to calculate. Recommend adding IPF.  9.4 - 12.3 FL    nRBC 0.00 0.0 - 0.2 K/uL    Seg Neutrophils 58 43 - 78 %    Lymphocytes 36 13 - 44 %    Monocytes 5 4.0 - 12.0 %    Eosinophils % 1 0.5 - 7.8 %    Basophils 0 0.0 - 2.0 %    Immature Granulocytes 0 0.0 - 5.0 %    Segs Absolute 3.5 1.7 - 8.2 K/UL    Absolute Lymph # 2.2 0.5 - 4.6 K/UL    Absolute Mono # 0.3 0.1 - 1.3 K/UL    Absolute Eos # 0.1 0.0 - 0.8 K/UL    Basophils Absolute 0.0 0.0 - 0.2 K/UL    Absolute Immature Granulocyte 0.0 0.0 - 0.5 K/UL    RBC Comment NORMOCYTIC/NORMOCHROMIC      WBC Comment Result Confirmed By Smear      Platelet Comment DECREASED      Differential Type AUTOMATED     Comprehensive Metabolic Panel   Result Value Ref Range    Sodium 136 133 - 143 mmol/L    Potassium 3.6 3.5 - 5.1 mmol/L    Chloride 109 101 - 110 mmol/L    CO2 24 21 - 32 mmol/L    Anion Gap 3 2 - 11 mmol/L    Glucose 103 (H) 65 - 100 mg/dL    BUN 15 6 - 23 MG/DL    Creatinine 0.67 0.6 - 1.0 MG/DL    Est, Glom Filt Rate >60 >60 ml/min/1.73m2    Calcium 9.1 8.3 - 10.4 MG/DL    Total Bilirubin 0.5 0.2 - 1.1 MG/DL    ALT 27 12 - 65 U/L    AST 18 15 - 37 U/L    Alk

## 2023-04-14 NOTE — ED TRIAGE NOTES
Patient states she had her blood drawn today due to her whole body having a rash. Patient states she was called to come to the ER due to her having low platelets.

## 2023-04-15 VITALS
RESPIRATION RATE: 16 BRPM | HEART RATE: 56 BPM | WEIGHT: 99.8 LBS | OXYGEN SATURATION: 98 % | DIASTOLIC BLOOD PRESSURE: 59 MMHG | BODY MASS INDEX: 17.13 KG/M2 | SYSTOLIC BLOOD PRESSURE: 97 MMHG | TEMPERATURE: 97.7 F

## 2023-04-15 PROBLEM — Z86.2 HISTORY OF ITP: Status: ACTIVE | Noted: 2023-04-15

## 2023-04-15 PROBLEM — K21.9 GERD (GASTROESOPHAGEAL REFLUX DISEASE): Status: ACTIVE | Noted: 2023-04-15

## 2023-04-15 LAB
ABO + RH BLD: NORMAL
ANION GAP SERPL CALC-SCNC: 4 MMOL/L (ref 2–11)
BASOPHILS # BLD: 0 K/UL (ref 0–0.2)
BASOPHILS NFR BLD: 1 % (ref 0–2)
BLD PROD TYP BPU: NORMAL
BLOOD BANK DISPENSE STATUS: NORMAL
BLOOD GROUP ANTIBODIES SERPL: NORMAL
BPU ID: NORMAL
BUN SERPL-MCNC: 14 MG/DL (ref 6–23)
CALCIUM SERPL-MCNC: 9.4 MG/DL (ref 8.3–10.4)
CHLORIDE SERPL-SCNC: 110 MMOL/L (ref 101–110)
CO2 SERPL-SCNC: 24 MMOL/L (ref 21–32)
CREAT SERPL-MCNC: 0.6 MG/DL (ref 0.6–1)
DIFFERENTIAL METHOD BLD: ABNORMAL
EOSINOPHIL # BLD: 0.2 K/UL (ref 0–0.8)
EOSINOPHIL NFR BLD: 3 % (ref 0.5–7.8)
ERYTHROCYTE [DISTWIDTH] IN BLOOD BY AUTOMATED COUNT: 12.8 % (ref 11.9–14.6)
GLUCOSE SERPL-MCNC: 88 MG/DL (ref 65–100)
HCT VFR BLD AUTO: 34.8 % (ref 35.8–46.3)
HGB BLD-MCNC: 11.7 G/DL (ref 11.7–15.4)
IMM GRANULOCYTES # BLD AUTO: 0 K/UL (ref 0–0.5)
IMM GRANULOCYTES NFR BLD AUTO: 0 % (ref 0–5)
LYMPHOCYTES # BLD: 2.3 K/UL (ref 0.5–4.6)
LYMPHOCYTES NFR BLD: 50 % (ref 13–44)
MCH RBC QN AUTO: 28.4 PG (ref 26.1–32.9)
MCHC RBC AUTO-ENTMCNC: 33.6 G/DL (ref 31.4–35)
MCV RBC AUTO: 84.5 FL (ref 82–102)
MONOCYTES # BLD: 0.4 K/UL (ref 0.1–1.3)
MONOCYTES NFR BLD: 10 % (ref 4–12)
NEUTS SEG # BLD: 1.7 K/UL (ref 1.7–8.2)
NEUTS SEG NFR BLD: 37 % (ref 43–78)
NRBC # BLD: 0 K/UL (ref 0–0.2)
PLATELET # BLD AUTO: 38 K/UL (ref 150–450)
PMV BLD AUTO: 10.5 FL (ref 9.4–12.3)
POTASSIUM SERPL-SCNC: 3.9 MMOL/L (ref 3.5–5.1)
RBC # BLD AUTO: 4.12 M/UL (ref 4.05–5.2)
SODIUM SERPL-SCNC: 138 MMOL/L (ref 133–143)
SPECIMEN EXP DATE BLD: NORMAL
UNIT DIVISION: 0
WBC # BLD AUTO: 4.6 K/UL (ref 4.3–11.1)

## 2023-04-15 PROCEDURE — 80048 BASIC METABOLIC PNL TOTAL CA: CPT

## 2023-04-15 PROCEDURE — 86900 BLOOD TYPING SEROLOGIC ABO: CPT

## 2023-04-15 PROCEDURE — 30233N1 TRANSFUSION OF NONAUTOLOGOUS RED BLOOD CELLS INTO PERIPHERAL VEIN, PERCUTANEOUS APPROACH: ICD-10-PCS | Performed by: INTERNAL MEDICINE

## 2023-04-15 PROCEDURE — 6370000000 HC RX 637 (ALT 250 FOR IP): Performed by: FAMILY MEDICINE

## 2023-04-15 PROCEDURE — 36415 COLL VENOUS BLD VENIPUNCTURE: CPT

## 2023-04-15 PROCEDURE — 99239 HOSP IP/OBS DSCHRG MGMT >30: CPT | Performed by: INTERNAL MEDICINE

## 2023-04-15 PROCEDURE — 36430 TRANSFUSION BLD/BLD COMPNT: CPT

## 2023-04-15 PROCEDURE — 2580000003 HC RX 258: Performed by: FAMILY MEDICINE

## 2023-04-15 PROCEDURE — 85025 COMPLETE CBC W/AUTO DIFF WBC: CPT

## 2023-04-15 PROCEDURE — P9035 PLATELET PHERES LEUKOREDUCED: HCPCS

## 2023-04-15 PROCEDURE — 6370000000 HC RX 637 (ALT 250 FOR IP): Performed by: INTERNAL MEDICINE

## 2023-04-15 RX ORDER — PANTOPRAZOLE SODIUM 40 MG/1
40 TABLET, DELAYED RELEASE ORAL DAILY
Qty: 30 TABLET | Refills: 3 | Status: SHIPPED | OUTPATIENT
Start: 2023-04-15

## 2023-04-15 RX ORDER — PREDNISONE 20 MG/1
40 TABLET ORAL 2 TIMES DAILY
Qty: 120 TABLET | Refills: 0 | Status: SHIPPED | OUTPATIENT
Start: 2023-04-15 | End: 2023-05-15

## 2023-04-15 RX ORDER — PREDNISONE 20 MG/1
60 TABLET ORAL DAILY
Status: DISCONTINUED | OUTPATIENT
Start: 2023-04-15 | End: 2023-04-15 | Stop reason: HOSPADM

## 2023-04-15 RX ADMIN — PANTOPRAZOLE SODIUM 40 MG: 40 TABLET, DELAYED RELEASE ORAL at 06:12

## 2023-04-15 RX ADMIN — SODIUM CHLORIDE, PRESERVATIVE FREE 10 ML: 5 INJECTION INTRAVENOUS at 08:17

## 2023-04-15 RX ADMIN — PREDNISONE 60 MG: 20 TABLET ORAL at 09:30

## 2023-04-15 RX ADMIN — SODIUM CHLORIDE, PRESERVATIVE FREE 10 ML: 5 INJECTION INTRAVENOUS at 01:37

## 2023-04-15 ASSESSMENT — PAIN SCALES - GENERAL
PAINLEVEL_OUTOF10: 0
PAINLEVEL_OUTOF10: 0

## 2023-04-15 NOTE — DISCHARGE INSTRUCTIONS
CBC (labs) to be drawn on Monday April 17, while out of town, MD order given to pt to take to [employer], per Maria Teresa Hall NP.

## 2023-04-15 NOTE — ED NOTES
TRANSFER - OUT REPORT:    Verbal report given to Yaima dong RN on [employer]  being transferred to [facility] for routine progression of patient care       Report consisted of patient's Situation, Background, Assessment and   Recommendations(SBAR). Information from the following report(s) ED SBAR was reviewed with the receiving nurse. Lines:   Peripheral IV 04/14/23 Right Forearm (Active)   Site Assessment Clean, dry & intact 04/14/23 1639   Line Status Blood return noted;Specimen collected; Flushed;Normal saline locked 04/14/23 1639   Phlebitis Assessment No symptoms 04/14/23 1639   Infiltration Assessment 0 04/14/23 1639        Opportunity for questions and clarification was provided.       Patient transported with:  EMS       Moss Lombard, RN  04/14/23 0479

## 2023-04-15 NOTE — H&P
Hospitalist History and Physical   Admit Date:  2023 10:41 PM   Name:  Richard August   Age:  23 y.o. Sex:  female  :  2000   MRN:  [id number]   Room:  [facility]    Presenting/Chief Complaint: No chief complaint on file. Reason(s) for Admission: Severe thrombocytopenia (HCC) [D69.6]     History of Present Illness:   Richard August is a 23 y.o. female with medical history of ITP who presented with red rashes on her body of 2-3 day duration and bleeding gums of 1 day duration. Bleeding gum has resolved. She is on her menstrual cycle and reported menorrhagia that lasted for >7 days and still heavy. She has a history of ITP and follows apology Dr. Chelsie Giles outpatient. She called her oncology office and was instructed to come to the ED. She last saw Dr. Chelsie Giles on 3/27/2023 after admitted in -2023 for severe thrombocythemia with platelets of 2, suspicious for ITP. During hospital course, patient received IVIG and was started on prednisone 60mg daily with rapid improvement. She completed her prednisone taper 3/26/2023. Plts was 75 on 3/27/23 which is most likely her new baseline per Heme/Onc. In ED, VSS. CBC with platelets of 0 (previously was 75 on 3/27/2023). Hematologist was consulted in ED advised transfusion of 2 units of platelets and transferred to [facility]. She received 1U Platelet prior to transfer. Assessment & Plan:     Severe thrombocytopenia   Hx of ITP  CBC with platelets of 0 on admission (previously was 75 on 3/27/2023 which was most likely her baseline her Heme/onc). Hematologist was consulted in ED and advised transfusion of 2 units of platelets and transferred to [facility]. Received 1U Platelet at [facility], will order 1U here  Monitor CBC, transfuse if Plts <10K  Consult Heme/Onc, appreciate recs    GERD  Cont home PPI    PT/OT evals and PPD needed/ordered? Yes  Diet: ADULT DIET;  Regular  VTE prophylaxis: SCD's   Code status: Full Code    Hospital Problems:  Principal Problem:

## 2023-04-15 NOTE — PROGRESS NOTES
Written discharge instructions given and discussed with pt, who verbalizes & demonstrates understanding. A two page order given to pt for MD orders for CBC while pt is out of town in [place] for brothers wedding. Pt to contact [facility] in [place]. Pt to be scheduled for outpatient follow up on Tues or Wednesday. Rashmi Faust NP to have pt added to appointment list and be notified for time. Again, pt verbalizes and demonstrates the above. Awaiting on discharge ride home.

## 2023-04-15 NOTE — PROGRESS NOTES
Patient chart reviewed. Discussed with Heme/Onc who has graciously agreed to take over as primary. Added prednisone 60 mg on board, has orders for platelet transfusion (with consent) already in place. Hospitalist will sign off. Please page/call with any questions or concerns. Patient not seen or examined, no bill for this encounter.

## 2023-04-15 NOTE — PROGRESS NOTES
It is with great diego we pray for your family today: \"Because you dared to believe,    Your carmen has healed you. Go with peace in your heart,    And be free from your suffering! \"    Yodit Henderson,    I believe that if you would touch my body I shall be healed. Give me the carmen to come boldly into your presence.     Amen

## 2023-04-15 NOTE — CONSENT
Informed Consent for Blood Component Transfusion Note    I have discussed with the patient the rationale for blood component transfusion; its benefits in treating or preventing fatigue, organ damage, or death; and its risk which includes mild transfusion reactions, rare risk of blood borne infection, or more serious but rare reactions. I have discussed the alternatives to transfusion, including the risk and consequences of not receiving transfusion. The patient had an opportunity to ask questions and had agreed to proceed with transfusion of blood components.     Electronically signed by Corinne Norwood DO on 4/15/23 at 12:53 AM EDT

## 2023-04-15 NOTE — DISCHARGE SUMMARY
to start this evening. She was given a printed lab requisition for CBC to be done at a lab in [place] on Monday. She will return to [place] on Wednesday afternoon of next week and I have requested a CBC at that time as well. She will be seen in our office no later than the end of next week for evaluation by a provider. She will go to ER for any new symptoms of bleeding, serious bruising, increased petechiae, new pain, headaches, dizziness, changes in vision or any other worrisome/concerning symptoms. Once again, she accepts risk of discharge/travel at this time. Consults:  IP CONSULT TO ONCOLOGY    Pertinent Diagnostic Studies:   Labs:    Recent Labs     23  16423  2101 04/15/23  0704   WBC 6.1 5.4 4.6   HGB 14.1 11.9 11.7   PLT 1* 25* 38*      Recent Labs     23  1642 04/15/23  0704    138   K 3.6 3.9    110   CO2 24 24   BUN 15 14       Imaging:  None       OBJECTIVE:  Patient Vitals for the past 8 hrs:   BP Temp Temp src Pulse Resp SpO2   04/15/23 0735 (!) 97/59 97.7 °F (36.5 °C) Oral 56 16 98 %   04/15/23 0346 (!) 94/54 98 °F (36.7 °C) Oral 55 19 98 %   04/15/23 0321 (!) 92/58 98 °F (36.7 °C) Oral 61 16 99 %     Temp (24hrs), Av.2 °F (36.8 °C), Min:97.7 °F (36.5 °C), Max:99.1 °F (37.3 °C)    04/15 07 - 04/15 1900  In: 120 [P.O.:120]  Out: -     Physical Exam:  Constitutional: Well developed, well nourished female in no acute distress, sitting comfortably on the examination table. HEENT: Normocephalic and atraumatic. Oropharynx is clear, mucous membranes are moist.  Extraocular muscles are intact. Sclerae anicteric. Neck supple. Skin Warm and dry. No rash noted. No erythema. No pallor. + petechial rash to lower extremities bilaterally. Respiratory Lungs are clear to auscultation bilaterally without wheezes, rales or rhonchi, normal air exchange without accessory muscle use. CVS Normal rate, regular rhythm and normal S1 and S2.   No murmurs, gallops, or

## 2023-04-16 LAB
BLD PROD TYP BPU: NORMAL
BLOOD BANK DISPENSE STATUS: NORMAL
BPU ID: NORMAL
UNIT DIVISION: 0

## 2023-04-17 ENCOUNTER — TELEPHONE (OUTPATIENT)
Dept: INTERNAL MEDICINE CLINIC | Facility: CLINIC | Age: 23
End: 2023-04-17

## 2023-04-17 DIAGNOSIS — D69.3 ITP SECONDARY TO INFECTION (HCC): Primary | ICD-10-CM

## 2023-04-17 NOTE — TELEPHONE ENCOUNTER
Called patient to schedule TCV appt following discharge from [facility] 04/15/2023. Dx Severe thrombocytopenia     Per discharge patient to follow up with PCP. Has appt 05/01/2023 with Dr Diana Mratinez and Hematology 04/20/23    1st attempt to contact patient using phone number listed in chart no VM set up    Patient returned call and states she has follow up appts scheduled.

## 2023-04-18 LAB
BASOPHILS # BLD AUTO: 0 X10E3/UL (ref 0–0.2)
BASOPHILS NFR BLD AUTO: 0 %
EOSINOPHIL # BLD AUTO: 0 X10E3/UL (ref 0–0.4)
EOSINOPHIL NFR BLD AUTO: 0 %
ERYTHROCYTE [DISTWIDTH] IN BLOOD BY AUTOMATED COUNT: 13.7 % (ref 11.7–15.4)
HCT VFR BLD AUTO: 40.7 % (ref 34–46.6)
HGB BLD-MCNC: 13.6 G/DL (ref 11.1–15.9)
IMM GRANULOCYTES # BLD AUTO: 0 X10E3/UL (ref 0–0.1)
IMM GRANULOCYTES NFR BLD AUTO: 0 %
LYMPHOCYTES # BLD AUTO: 1.6 X10E3/UL (ref 0.7–3.1)
LYMPHOCYTES NFR BLD AUTO: 22 %
MCH RBC QN AUTO: 28.8 PG (ref 26.6–33)
MCHC RBC AUTO-ENTMCNC: 33.4 G/DL (ref 31.5–35.7)
MCV RBC AUTO: 86 FL (ref 79–97)
MONOCYTES # BLD AUTO: 0.2 X10E3/UL (ref 0.1–0.9)
MONOCYTES NFR BLD AUTO: 3 %
MORPHOLOGY BLD-IMP: ABNORMAL
NEUTROPHILS # BLD AUTO: 5.4 X10E3/UL (ref 1.4–7)
NEUTROPHILS NFR BLD AUTO: 75 %
PLATELET # BLD AUTO: 52 X10E3/UL (ref 150–450)
RBC # BLD AUTO: 4.72 X10E6/UL (ref 3.77–5.28)
WBC # BLD AUTO: 7.2 X10E3/UL (ref 3.4–10.8)

## 2023-04-20 ENCOUNTER — OFFICE VISIT (OUTPATIENT)
Dept: ONCOLOGY | Age: 23
End: 2023-04-20

## 2023-04-20 ENCOUNTER — HOSPITAL ENCOUNTER (OUTPATIENT)
Dept: LAB | Age: 23
Discharge: HOME | End: 2023-04-20
Payer: COMMERCIAL

## 2023-04-20 VITALS
OXYGEN SATURATION: 100 % | TEMPERATURE: 97.8 F | HEART RATE: 61 BPM | BODY MASS INDEX: 16.73 KG/M2 | SYSTOLIC BLOOD PRESSURE: 105 MMHG | WEIGHT: 98 LBS | DIASTOLIC BLOOD PRESSURE: 68 MMHG | RESPIRATION RATE: 14 BRPM | HEIGHT: 64 IN

## 2023-04-20 DIAGNOSIS — R20.8 DYSESTHESIA: ICD-10-CM

## 2023-04-20 DIAGNOSIS — D69.3 ITP SECONDARY TO INFECTION (HCC): ICD-10-CM

## 2023-04-20 DIAGNOSIS — Z09 HOSPITAL DISCHARGE FOLLOW-UP: ICD-10-CM

## 2023-04-20 DIAGNOSIS — D69.3 ITP SECONDARY TO INFECTION (HCC): Primary | ICD-10-CM

## 2023-04-20 LAB
ALBUMIN SERPL-MCNC: 3.9 G/DL (ref 3.5–5)
ALBUMIN/GLOB SERPL: 0.9 (ref 0.4–1.6)
ALP SERPL-CCNC: 50 U/L (ref 50–136)
ALT SERPL-CCNC: 20 U/L (ref 12–65)
ANION GAP SERPL CALC-SCNC: 5 MMOL/L (ref 2–11)
AST SERPL-CCNC: 9 U/L (ref 15–37)
BASOPHILS # BLD: 0 K/UL (ref 0–0.2)
BASOPHILS NFR BLD: 0 % (ref 0–2)
BILIRUB SERPL-MCNC: 0.5 MG/DL (ref 0.2–1.1)
BUN SERPL-MCNC: 17 MG/DL (ref 6–23)
CALCIUM SERPL-MCNC: 9.5 MG/DL (ref 8.3–10.4)
CHLORIDE SERPL-SCNC: 108 MMOL/L (ref 101–110)
CO2 SERPL-SCNC: 25 MMOL/L (ref 21–32)
CREAT SERPL-MCNC: 0.6 MG/DL (ref 0.6–1)
DIFFERENTIAL METHOD BLD: ABNORMAL
EOSINOPHIL # BLD: 0 K/UL (ref 0–0.8)
EOSINOPHIL NFR BLD: 0 % (ref 0.5–7.8)
ERYTHROCYTE [DISTWIDTH] IN BLOOD BY AUTOMATED COUNT: 12.9 % (ref 11.9–14.6)
GLOBULIN SER CALC-MCNC: 4.2 G/DL (ref 2.8–4.5)
GLUCOSE SERPL-MCNC: 89 MG/DL (ref 65–100)
HCT VFR BLD AUTO: 40.4 % (ref 35.8–46.3)
HGB BLD-MCNC: 13.4 G/DL (ref 11.7–15.4)
IMM GRANULOCYTES # BLD AUTO: 0.1 K/UL (ref 0–0.5)
IMM GRANULOCYTES NFR BLD AUTO: 1 % (ref 0–5)
LYMPHOCYTES # BLD: 3.3 K/UL (ref 0.5–4.6)
LYMPHOCYTES NFR BLD: 27 % (ref 13–44)
MCH RBC QN AUTO: 27.7 PG (ref 26.1–32.9)
MCHC RBC AUTO-ENTMCNC: 33.2 G/DL (ref 31.4–35)
MCV RBC AUTO: 83.5 FL (ref 82–102)
MONOCYTES # BLD: 0.8 K/UL (ref 0.1–1.3)
MONOCYTES NFR BLD: 6 % (ref 4–12)
NEUTS SEG # BLD: 7.9 K/UL (ref 1.7–8.2)
NEUTS SEG NFR BLD: 66 % (ref 43–78)
NRBC # BLD: 0 K/UL (ref 0–0.2)
PLATELET # BLD AUTO: 141 K/UL (ref 150–450)
PLATELET # BLD AUTO: 162 K/UL (ref 150–450)
PMV BLD AUTO: 12.7 FL (ref 9.4–12.3)
POTASSIUM SERPL-SCNC: 4.4 MMOL/L (ref 3.5–5.1)
PROT SERPL-MCNC: 8.1 G/DL (ref 6.3–8.2)
RBC # BLD AUTO: 4.84 M/UL (ref 4.05–5.2)
SODIUM SERPL-SCNC: 138 MMOL/L (ref 133–143)
WBC # BLD AUTO: 12.1 K/UL (ref 4.3–11.1)

## 2023-04-20 PROCEDURE — 36415 COLL VENOUS BLD VENIPUNCTURE: CPT

## 2023-04-20 PROCEDURE — 85025 COMPLETE CBC W/AUTO DIFF WBC: CPT

## 2023-04-20 PROCEDURE — 80053 COMPREHEN METABOLIC PANEL: CPT

## 2023-04-20 PROCEDURE — 85049 AUTOMATED PLATELET COUNT: CPT

## 2023-04-20 RX ORDER — ACETAMINOPHEN 325 MG/1
650 TABLET ORAL
OUTPATIENT
Start: 2023-04-23

## 2023-04-20 RX ORDER — SODIUM CHLORIDE 9 MG/ML
5-250 INJECTION, SOLUTION INTRAVENOUS PRN
OUTPATIENT
Start: 2023-04-23

## 2023-04-20 RX ORDER — SODIUM CHLORIDE 0.9 % (FLUSH) 0.9 %
5-40 SYRINGE (ML) INJECTION PRN
OUTPATIENT
Start: 2023-04-23

## 2023-04-20 RX ORDER — ALBUTEROL SULFATE 90 UG/1
4 AEROSOL, METERED RESPIRATORY (INHALATION) PRN
OUTPATIENT
Start: 2023-04-23

## 2023-04-20 RX ORDER — ONDANSETRON 2 MG/ML
8 INJECTION INTRAMUSCULAR; INTRAVENOUS
OUTPATIENT
Start: 2023-04-23

## 2023-04-20 RX ORDER — EPINEPHRINE 1 MG/ML
0.3 INJECTION, SOLUTION, CONCENTRATE INTRAVENOUS PRN
OUTPATIENT
Start: 2023-04-23

## 2023-04-20 RX ORDER — FAMOTIDINE 10 MG/ML
20 INJECTION, SOLUTION INTRAVENOUS
OUTPATIENT
Start: 2023-04-23

## 2023-04-20 RX ORDER — ACETAMINOPHEN 325 MG/1
650 TABLET ORAL ONCE
OUTPATIENT
Start: 2023-04-23 | End: 2023-04-23

## 2023-04-20 RX ORDER — DIPHENHYDRAMINE HCL 25 MG
25 TABLET ORAL ONCE
OUTPATIENT
Start: 2023-04-23 | End: 2023-04-23

## 2023-04-20 RX ORDER — DIPHENHYDRAMINE HYDROCHLORIDE 50 MG/ML
50 INJECTION INTRAMUSCULAR; INTRAVENOUS
OUTPATIENT
Start: 2023-04-23

## 2023-04-20 RX ORDER — SODIUM CHLORIDE 9 MG/ML
INJECTION, SOLUTION INTRAVENOUS CONTINUOUS
OUTPATIENT
Start: 2023-04-23

## 2023-04-20 RX ORDER — HEPARIN SODIUM (PORCINE) LOCK FLUSH IV SOLN 100 UNIT/ML 100 UNIT/ML
500 SOLUTION INTRAVENOUS PRN
OUTPATIENT
Start: 2023-04-23

## 2023-04-20 RX ORDER — MEPERIDINE HYDROCHLORIDE 50 MG/ML
12.5 INJECTION INTRAMUSCULAR; INTRAVENOUS; SUBCUTANEOUS PRN
OUTPATIENT
Start: 2023-04-23

## 2023-04-20 ASSESSMENT — PATIENT HEALTH QUESTIONNAIRE - PHQ9
SUM OF ALL RESPONSES TO PHQ9 QUESTIONS 1 & 2: 0
SUM OF ALL RESPONSES TO PHQ QUESTIONS 1-9: 0
1. LITTLE INTEREST OR PLEASURE IN DOING THINGS: 0
SUM OF ALL RESPONSES TO PHQ QUESTIONS 1-9: 0
SUM OF ALL RESPONSES TO PHQ QUESTIONS 1-9: 0
2. FEELING DOWN, DEPRESSED OR HOPELESS: 0
SUM OF ALL RESPONSES TO PHQ QUESTIONS 1-9: 0

## 2023-04-20 NOTE — PROGRESS NOTES
[facility] Hematology & Oncology: Office Visit Progress Note    Chief Complaint:    ITP    History of Present Illness:  23 y.o. female without significant past medical history presented to the emergency room complaining of epistaxes and rash. Patient reports developing rashes on both legs over the past 2 days, having been having intermittent nosebleeds as well. She was recently seen in the ER on 2/4/2023 with abdominal pain and diarrhea, diagnosed with a viral gastroenteritis at that time which did not require admission. She reports some residue abdominal pain but there has been improvement over the past week. ED work-up showed WBC 6.5, hemoglobin 12.3 down to 11.5, platelet 2. Abdominal imaging was unremarkable. She is iron deficient with transferrin saturation 15%. PTT elevated at 75, normal INR/PTT/fibrinogen level. SPEP pending. Hematology is consulted. Interim history update in A/P. Review of Systems:  Constitutional Denies fever or chills. Denies weight loss or appetite changes. Denies fatigue. Denies anorexia. HEENT Denies trauma, bluring vision, hearing loss, ear pain, nosebleeds, sore throat, neck pain and ear discharge. Skin Denies lesions or rashes. Lungs Denies shortness of breath, cough, sputum production or hemoptysis. Cardiovascular Denies chest pain, palpitations, orthopnea, claudication and leg swelling. Gastrointestinal Denies nausea, vomiting, bowel changes. Denies bloody or black stools. Denies abdominal pain.  Denies dysuria, frequency or hesitancy of urination   Neuro Denies headaches, visual changes or ataxia. Denies dizziness, tingling, tremors, sensory change, speech change, focal weakness and headaches. Hematology Denies nasal/gum bleeding, denies easy bruise   Endo Denies heat/cold intolerance, denies diabetes. MSK Denies back pain, swollen legs, myalgias and falls. Psychiatric/Behavioral Denies depression and substance abuse.  The patient is not

## 2023-04-20 NOTE — PATIENT INSTRUCTIONS
Treatment Summary has been discussed and given to patient: n/a        -------------------------------------------------------------------------------------------------------------------  Please call our office at [phone] if you have any  of the following symptoms:   Fever of 100.5 or greater  Chills  Shortness of breath  Swelling or pain in one leg    After office hours an answering service is available and will contact a provider for emergencies or if you are experiencing any of the above symptoms. Patient does express an interest in My Chart. My Chart log in information explained on the after visit summary printout at the [facility] desk.     Viri Estrella RN

## 2023-04-21 LAB — PATH REV BLD -IMP: NORMAL

## 2023-04-24 ENCOUNTER — HOSPITAL ENCOUNTER (OUTPATIENT)
Dept: INFUSION THERAPY | Age: 23
End: 2023-04-24

## 2023-04-25 ENCOUNTER — HOSPITAL ENCOUNTER (OUTPATIENT)
Dept: CT IMAGING | Age: 23
Discharge: HOME | End: 2023-04-28
Payer: COMMERCIAL

## 2023-04-25 DIAGNOSIS — D69.3 ITP SECONDARY TO INFECTION (HCC): ICD-10-CM

## 2023-04-25 PROCEDURE — 71260 CT THORAX DX C+: CPT

## 2023-04-25 PROCEDURE — 6360000004 HC RX CONTRAST MEDICATION: Performed by: INTERNAL MEDICINE

## 2023-04-25 PROCEDURE — 2580000003 HC RX 258: Performed by: INTERNAL MEDICINE

## 2023-04-25 RX ORDER — 0.9 % SODIUM CHLORIDE 0.9 %
100 INTRAVENOUS SOLUTION INTRAVENOUS
Status: COMPLETED | OUTPATIENT
Start: 2023-04-25 | End: 2023-04-25

## 2023-04-25 RX ORDER — SODIUM CHLORIDE 0.9 % (FLUSH) 0.9 %
10 SYRINGE (ML) INJECTION
Status: COMPLETED | OUTPATIENT
Start: 2023-04-25 | End: 2023-04-25

## 2023-04-25 RX ADMIN — SODIUM CHLORIDE 100 ML: 9 INJECTION, SOLUTION INTRAVENOUS at 12:03

## 2023-04-25 RX ADMIN — SODIUM CHLORIDE, PRESERVATIVE FREE 10 ML: 5 INJECTION INTRAVENOUS at 12:03

## 2023-04-25 RX ADMIN — IOPAMIDOL 80 ML: 755 INJECTION, SOLUTION INTRAVENOUS at 12:03

## 2023-04-27 ENCOUNTER — TELEMEDICINE (OUTPATIENT)
Dept: ONCOLOGY | Age: 23
End: 2023-04-27
Payer: COMMERCIAL

## 2023-04-27 ENCOUNTER — TELEPHONE (OUTPATIENT)
Dept: ONCOLOGY | Age: 23
End: 2023-04-27

## 2023-04-27 DIAGNOSIS — R04.0 EPISTAXIS: ICD-10-CM

## 2023-04-27 DIAGNOSIS — R23.3 EASY BRUISING: ICD-10-CM

## 2023-04-27 DIAGNOSIS — D69.3 ITP SECONDARY TO INFECTION (HCC): Primary | ICD-10-CM

## 2023-04-27 PROCEDURE — 99214 OFFICE O/P EST MOD 30 MIN: CPT | Performed by: INTERNAL MEDICINE

## 2023-04-27 NOTE — TELEPHONE ENCOUNTER
Patient reported no one has contacted her regarding IVIG infusion referral to [facility]. Called [facility] to follow up on patient's referral for IVIG infusion sent on 4/20/23 and 4/24. Called [facility] and unable to speak to an associate, SILVERIO asking for call back. Will send referral documentation again today.

## 2023-04-27 NOTE — PATIENT INSTRUCTIONS
Patient Instructions from Today's Visit    Reason for Visit:  Follow up    Plan:  -Labs tomorrow to check platelets  -Your CT looks good  -Will call [facility] regarding IVIG infusion    Follow Up:  As scheduled    Recent Lab Results:  N/A    Treatment Summary has been discussed and given to patient: n/a        -------------------------------------------------------------------------------------------------------------------  Please call our office at [phone] if you have any  of the following symptoms:   Fever of 100.5 or greater  Chills  Shortness of breath  Swelling or pain in one leg    After office hours an answering service is available and will contact a provider for emergencies or if you are experiencing any of the above symptoms. Patient does express an interest in My Chart. My Chart log in information explained on the after visit summary printout at the [facility] desk.     Selma Gray RN

## 2023-04-27 NOTE — PROGRESS NOTES
Ronaldo Bates (:  2000) is a Established patient, here for evaluation of the following:    Chief Complaint:    ITP     History of Present Illness:  23 y.o. female without significant past medical history presented to the emergency room complaining of epistaxes and rash. Patient reports developing rashes on both legs over the past 2 days, having been having intermittent nosebleeds as well. She was recently seen in the ER on 2023 with abdominal pain and diarrhea, diagnosed with a viral gastroenteritis at that time which did not require admission. She reports some residue abdominal pain but there has been improvement over the past week. ED work-up showed WBC 6.5, hemoglobin 12.3 down to 11.5, platelet 2. Abdominal imaging was unremarkable. She is iron deficient with transferrin saturation 15%. PTT elevated at 75, normal INR/PTT/fibrinogen level. SPEP pending. Hematology is consulted. Interim history update in A/P. Review of Systems:  Constitutional Denies fever or chills. Denies weight loss or appetite changes. Denies fatigue. Denies anorexia. HEENT Denies trauma, bluring vision, hearing loss, ear pain, nosebleeds, sore throat, neck pain and ear discharge. Skin Denies lesions or rashes. Lungs Denies shortness of breath, cough, sputum production or hemoptysis. Cardiovascular Denies chest pain, palpitations, orthopnea, claudication and leg swelling. Gastrointestinal Denies nausea, vomiting, bowel changes. Denies bloody or black stools. Denies abdominal pain.  Denies dysuria, frequency or hesitancy of urination   Neuro Denies headaches, visual changes or ataxia. Denies dizziness, tingling, tremors, sensory change, speech change, focal weakness and headaches. Hematology Denies nasal/gum bleeding, denies easy bruise   Endo Denies heat/cold intolerance, denies diabetes. MSK Denies back pain, swollen legs, myalgias and falls.       Psychiatric/Behavioral Denies depression

## 2023-04-28 ENCOUNTER — HOSPITAL ENCOUNTER (OUTPATIENT)
Dept: INFUSION THERAPY | Age: 23
Discharge: HOME | End: 2023-04-28

## 2023-04-28 ENCOUNTER — HOSPITAL ENCOUNTER (INPATIENT)
Age: 23
LOS: 4 days | Discharge: HOME | DRG: 813 | End: 2023-05-02
Attending: INTERNAL MEDICINE | Admitting: INTERNAL MEDICINE
Payer: COMMERCIAL

## 2023-04-28 ENCOUNTER — OFFICE VISIT (OUTPATIENT)
Dept: ONCOLOGY | Age: 23
End: 2023-04-28

## 2023-04-28 ENCOUNTER — CLINICAL DOCUMENTATION (OUTPATIENT)
Dept: ONCOLOGY | Age: 23
End: 2023-04-28

## 2023-04-28 ENCOUNTER — HOSPITAL ENCOUNTER (OUTPATIENT)
Dept: LAB | Age: 23
Discharge: HOME | End: 2023-04-28
Payer: COMMERCIAL

## 2023-04-28 VITALS
HEIGHT: 64 IN | SYSTOLIC BLOOD PRESSURE: 102 MMHG | DIASTOLIC BLOOD PRESSURE: 69 MMHG | TEMPERATURE: 98.5 F | WEIGHT: 99.6 LBS | OXYGEN SATURATION: 98 % | BODY MASS INDEX: 17 KG/M2 | RESPIRATION RATE: 12 BRPM | HEART RATE: 77 BPM

## 2023-04-28 DIAGNOSIS — D69.3 ITP SECONDARY TO INFECTION (HCC): Primary | ICD-10-CM

## 2023-04-28 DIAGNOSIS — D69.3 ITP SECONDARY TO INFECTION (HCC): ICD-10-CM

## 2023-04-28 LAB
BASOPHILS # BLD: 0.1 K/UL (ref 0–0.2)
BASOPHILS NFR BLD: 0 % (ref 0–2)
DIFFERENTIAL METHOD BLD: ABNORMAL
EOSINOPHIL # BLD: 0.1 K/UL (ref 0–0.8)
EOSINOPHIL NFR BLD: 0 % (ref 0.5–7.8)
ERYTHROCYTE [DISTWIDTH] IN BLOOD BY AUTOMATED COUNT: 13.2 % (ref 11.9–14.6)
HCT VFR BLD AUTO: 44.2 % (ref 35.8–46.3)
HGB BLD-MCNC: 15 G/DL (ref 11.7–15.4)
IMM GRANULOCYTES # BLD AUTO: 0.2 K/UL (ref 0–0.5)
IMM GRANULOCYTES NFR BLD AUTO: 1 % (ref 0–5)
LYMPHOCYTES # BLD: 4.2 K/UL (ref 0.5–4.6)
LYMPHOCYTES NFR BLD: 24 % (ref 13–44)
MCH RBC QN AUTO: 28.1 PG (ref 26.1–32.9)
MCHC RBC AUTO-ENTMCNC: 33.9 G/DL (ref 31.4–35)
MCV RBC AUTO: 82.8 FL (ref 82–102)
MONOCYTES # BLD: 1.1 K/UL (ref 0.1–1.3)
MONOCYTES NFR BLD: 6 % (ref 4–12)
NEUTS SEG # BLD: 11.8 K/UL (ref 1.7–8.2)
NEUTS SEG NFR BLD: 69 % (ref 43–78)
NRBC # BLD: 0 K/UL (ref 0–0.2)
PLATELET # BLD AUTO: 15 K/UL (ref 150–450)
PMV BLD AUTO: ABNORMAL FL (ref 9.4–12.3)
RBC # BLD AUTO: 5.34 M/UL (ref 4.05–5.2)
WBC # BLD AUTO: 17.3 K/UL (ref 4.3–11.1)

## 2023-04-28 PROCEDURE — 6360000002 HC RX W HCPCS: Performed by: NURSE PRACTITIONER

## 2023-04-28 PROCEDURE — 1100000000 HC RM PRIVATE

## 2023-04-28 PROCEDURE — 6370000000 HC RX 637 (ALT 250 FOR IP): Performed by: NURSE PRACTITIONER

## 2023-04-28 PROCEDURE — 6370000000 HC RX 637 (ALT 250 FOR IP): Performed by: INTERNAL MEDICINE

## 2023-04-28 PROCEDURE — 99222 1ST HOSP IP/OBS MODERATE 55: CPT | Performed by: INTERNAL MEDICINE

## 2023-04-28 PROCEDURE — 36415 COLL VENOUS BLD VENIPUNCTURE: CPT

## 2023-04-28 PROCEDURE — 85025 COMPLETE CBC W/AUTO DIFF WBC: CPT

## 2023-04-28 PROCEDURE — 2580000003 HC RX 258: Performed by: NURSE PRACTITIONER

## 2023-04-28 RX ORDER — ACETAMINOPHEN 325 MG/1
650 TABLET ORAL EVERY 6 HOURS PRN
Status: DISCONTINUED | OUTPATIENT
Start: 2023-04-28 | End: 2023-05-02 | Stop reason: HOSPADM

## 2023-04-28 RX ORDER — POLYETHYLENE GLYCOL 3350 17 G/17G
17 POWDER, FOR SOLUTION ORAL DAILY PRN
Status: DISCONTINUED | OUTPATIENT
Start: 2023-04-28 | End: 2023-05-02 | Stop reason: HOSPADM

## 2023-04-28 RX ORDER — ACETAMINOPHEN 650 MG/1
650 SUPPOSITORY RECTAL EVERY 6 HOURS PRN
Status: DISCONTINUED | OUTPATIENT
Start: 2023-04-28 | End: 2023-05-02 | Stop reason: HOSPADM

## 2023-04-28 RX ORDER — ENOXAPARIN SODIUM 100 MG/ML
40 INJECTION SUBCUTANEOUS DAILY
Status: DISCONTINUED | OUTPATIENT
Start: 2023-04-28 | End: 2023-04-28

## 2023-04-28 RX ORDER — SODIUM CHLORIDE 0.9 % (FLUSH) 0.9 %
5-40 SYRINGE (ML) INJECTION PRN
Status: DISCONTINUED | OUTPATIENT
Start: 2023-04-28 | End: 2023-05-02 | Stop reason: HOSPADM

## 2023-04-28 RX ORDER — PANTOPRAZOLE SODIUM 40 MG/1
40 TABLET, DELAYED RELEASE ORAL DAILY
Status: DISCONTINUED | OUTPATIENT
Start: 2023-04-28 | End: 2023-05-02 | Stop reason: HOSPADM

## 2023-04-28 RX ORDER — SODIUM CHLORIDE 9 MG/ML
INJECTION, SOLUTION INTRAVENOUS PRN
Status: DISCONTINUED | OUTPATIENT
Start: 2023-04-28 | End: 2023-05-02 | Stop reason: HOSPADM

## 2023-04-28 RX ORDER — SODIUM CHLORIDE 0.9 % (FLUSH) 0.9 %
5-40 SYRINGE (ML) INJECTION EVERY 12 HOURS SCHEDULED
Status: DISCONTINUED | OUTPATIENT
Start: 2023-04-28 | End: 2023-05-02 | Stop reason: HOSPADM

## 2023-04-28 RX ORDER — PREDNISONE 20 MG/1
60 TABLET ORAL
Status: DISCONTINUED | OUTPATIENT
Start: 2023-04-29 | End: 2023-04-28

## 2023-04-28 RX ADMIN — IMMUNE GLOBULIN (HUMAN) 45 G: 10 INJECTION INTRAVENOUS; SUBCUTANEOUS at 17:31

## 2023-04-28 RX ADMIN — PREDNISONE 70 MG: 20 TABLET ORAL at 16:08

## 2023-04-28 RX ADMIN — SODIUM CHLORIDE, PRESERVATIVE FREE 10 ML: 5 INJECTION INTRAVENOUS at 20:28

## 2023-04-28 RX ADMIN — PANTOPRAZOLE SODIUM 40 MG: 40 TABLET, DELAYED RELEASE ORAL at 15:54

## 2023-04-28 RX ADMIN — ACETAMINOPHEN 650 MG: 325 TABLET ORAL at 17:31

## 2023-04-28 ASSESSMENT — PATIENT HEALTH QUESTIONNAIRE - PHQ9
SUM OF ALL RESPONSES TO PHQ QUESTIONS 1-9: 0
2. FEELING DOWN, DEPRESSED OR HOPELESS: 0
SUM OF ALL RESPONSES TO PHQ9 QUESTIONS 1 & 2: 0
1. LITTLE INTEREST OR PLEASURE IN DOING THINGS: 0

## 2023-04-28 ASSESSMENT — PAIN SCALES - GENERAL: PAINLEVEL_OUTOF10: 0

## 2023-04-29 PROBLEM — T14.8XXA BRUISING: Status: ACTIVE | Noted: 2023-04-29

## 2023-04-29 PROBLEM — Z92.241 HISTORY OF RECENT STEROID USE: Status: ACTIVE | Noted: 2023-04-29

## 2023-04-29 PROBLEM — D69.6 THROMBOCYTOPENIA (HCC): Status: ACTIVE | Noted: 2023-04-29

## 2023-04-29 LAB
ALBUMIN SERPL-MCNC: 3.6 G/DL (ref 3.5–5)
ALBUMIN/GLOB SERPL: 0.6 (ref 0.4–1.6)
ALP SERPL-CCNC: 52 U/L (ref 50–136)
ALT SERPL-CCNC: 26 U/L (ref 12–65)
ANION GAP SERPL CALC-SCNC: 2 MMOL/L (ref 2–11)
AST SERPL-CCNC: 12 U/L (ref 15–37)
BASOPHILS # BLD: 0 K/UL (ref 0–0.2)
BASOPHILS NFR BLD: 0 % (ref 0–2)
BILIRUB SERPL-MCNC: 0.3 MG/DL (ref 0.2–1.1)
BUN SERPL-MCNC: 20 MG/DL (ref 6–23)
CALCIUM SERPL-MCNC: 9.4 MG/DL (ref 8.3–10.4)
CHLORIDE SERPL-SCNC: 107 MMOL/L (ref 101–110)
CO2 SERPL-SCNC: 25 MMOL/L (ref 21–32)
CREAT SERPL-MCNC: 0.7 MG/DL (ref 0.6–1)
DIFFERENTIAL METHOD BLD: ABNORMAL
EOSINOPHIL # BLD: 0 K/UL (ref 0–0.8)
EOSINOPHIL NFR BLD: 0 % (ref 0.5–7.8)
ERYTHROCYTE [DISTWIDTH] IN BLOOD BY AUTOMATED COUNT: 13.1 % (ref 11.9–14.6)
GLOBULIN SER CALC-MCNC: 5.9 G/DL (ref 2.8–4.5)
GLUCOSE SERPL-MCNC: 149 MG/DL (ref 65–100)
HCT VFR BLD AUTO: 37.8 % (ref 35.8–46.3)
HGB BLD-MCNC: 12.9 G/DL (ref 11.7–15.4)
IMM GRANULOCYTES # BLD AUTO: 0.1 K/UL (ref 0–0.5)
IMM GRANULOCYTES NFR BLD AUTO: 1 % (ref 0–5)
LYMPHOCYTES # BLD: 1.8 K/UL (ref 0.5–4.6)
LYMPHOCYTES NFR BLD: 16 % (ref 13–44)
MCH RBC QN AUTO: 28.7 PG (ref 26.1–32.9)
MCHC RBC AUTO-ENTMCNC: 34.1 G/DL (ref 31.4–35)
MCV RBC AUTO: 84 FL (ref 82–102)
MONOCYTES # BLD: 0.6 K/UL (ref 0.1–1.3)
MONOCYTES NFR BLD: 5 % (ref 4–12)
NEUTS SEG # BLD: 8.8 K/UL (ref 1.7–8.2)
NEUTS SEG NFR BLD: 78 % (ref 43–78)
NRBC # BLD: 0 K/UL (ref 0–0.2)
PLATELET # BLD AUTO: 36 K/UL (ref 150–450)
PMV BLD AUTO: ABNORMAL FL (ref 9.4–12.3)
POTASSIUM SERPL-SCNC: 4.4 MMOL/L (ref 3.5–5.1)
PROT SERPL-MCNC: 9.5 G/DL (ref 6.3–8.2)
RBC # BLD AUTO: 4.5 M/UL (ref 4.05–5.2)
SODIUM SERPL-SCNC: 134 MMOL/L (ref 133–143)
WBC # BLD AUTO: 11.3 K/UL (ref 4.3–11.1)

## 2023-04-29 PROCEDURE — 80053 COMPREHEN METABOLIC PANEL: CPT

## 2023-04-29 PROCEDURE — 99232 SBSQ HOSP IP/OBS MODERATE 35: CPT | Performed by: INTERNAL MEDICINE

## 2023-04-29 PROCEDURE — 2580000003 HC RX 258: Performed by: NURSE PRACTITIONER

## 2023-04-29 PROCEDURE — 85025 COMPLETE CBC W/AUTO DIFF WBC: CPT

## 2023-04-29 PROCEDURE — 36415 COLL VENOUS BLD VENIPUNCTURE: CPT

## 2023-04-29 PROCEDURE — APPSS30 APP SPLIT SHARED TIME 16-30 MINUTES: Performed by: NURSE PRACTITIONER

## 2023-04-29 PROCEDURE — 6370000000 HC RX 637 (ALT 250 FOR IP): Performed by: NURSE PRACTITIONER

## 2023-04-29 PROCEDURE — 1100000000 HC RM PRIVATE

## 2023-04-29 RX ADMIN — SODIUM CHLORIDE, PRESERVATIVE FREE 10 ML: 5 INJECTION INTRAVENOUS at 22:23

## 2023-04-29 RX ADMIN — PREDNISONE 70 MG: 20 TABLET ORAL at 08:49

## 2023-04-29 RX ADMIN — SODIUM CHLORIDE, PRESERVATIVE FREE 10 ML: 5 INJECTION INTRAVENOUS at 08:49

## 2023-04-29 RX ADMIN — POLYETHYLENE GLYCOL 3350 17 G: 17 POWDER, FOR SOLUTION ORAL at 08:57

## 2023-04-29 RX ADMIN — PANTOPRAZOLE SODIUM 40 MG: 40 TABLET, DELAYED RELEASE ORAL at 08:49

## 2023-04-29 ASSESSMENT — PAIN SCALES - GENERAL: PAINLEVEL_OUTOF10: 0

## 2023-04-30 LAB
BASOPHILS # BLD: 0.1 K/UL (ref 0–0.2)
BASOPHILS NFR BLD: 0 % (ref 0–2)
DIFFERENTIAL METHOD BLD: ABNORMAL
EOSINOPHIL # BLD: 0.1 K/UL (ref 0–0.8)
EOSINOPHIL NFR BLD: 1 % (ref 0.5–7.8)
ERYTHROCYTE [DISTWIDTH] IN BLOOD BY AUTOMATED COUNT: 13.2 % (ref 11.9–14.6)
HCT VFR BLD AUTO: 37.8 % (ref 35.8–46.3)
HGB BLD-MCNC: 12.9 G/DL (ref 11.7–15.4)
IMM GRANULOCYTES # BLD AUTO: 0.3 K/UL (ref 0–0.5)
IMM GRANULOCYTES NFR BLD AUTO: 2 % (ref 0–5)
LYMPHOCYTES # BLD: 5.3 K/UL (ref 0.5–4.6)
LYMPHOCYTES NFR BLD: 35 % (ref 13–44)
MCH RBC QN AUTO: 28.7 PG (ref 26.1–32.9)
MCHC RBC AUTO-ENTMCNC: 34.1 G/DL (ref 31.4–35)
MCV RBC AUTO: 84.2 FL (ref 82–102)
MONOCYTES # BLD: 1 K/UL (ref 0.1–1.3)
MONOCYTES NFR BLD: 6 % (ref 4–12)
NEUTS SEG # BLD: 8.5 K/UL (ref 1.7–8.2)
NEUTS SEG NFR BLD: 56 % (ref 43–78)
NRBC # BLD: 0 K/UL (ref 0–0.2)
PLATELET # BLD AUTO: 90 K/UL (ref 150–450)
PMV BLD AUTO: 13.4 FL (ref 9.4–12.3)
RBC # BLD AUTO: 4.49 M/UL (ref 4.05–5.2)
WBC # BLD AUTO: 15.2 K/UL (ref 4.3–11.1)

## 2023-04-30 PROCEDURE — 36415 COLL VENOUS BLD VENIPUNCTURE: CPT

## 2023-04-30 PROCEDURE — 2580000003 HC RX 258: Performed by: NURSE PRACTITIONER

## 2023-04-30 PROCEDURE — 6360000002 HC RX W HCPCS: Performed by: NURSE PRACTITIONER

## 2023-04-30 PROCEDURE — APPSS30 APP SPLIT SHARED TIME 16-30 MINUTES: Performed by: NURSE PRACTITIONER

## 2023-04-30 PROCEDURE — 85025 COMPLETE CBC W/AUTO DIFF WBC: CPT

## 2023-04-30 PROCEDURE — 1100000000 HC RM PRIVATE

## 2023-04-30 PROCEDURE — 6370000000 HC RX 637 (ALT 250 FOR IP): Performed by: NURSE PRACTITIONER

## 2023-04-30 PROCEDURE — 99231 SBSQ HOSP IP/OBS SF/LOW 25: CPT | Performed by: INTERNAL MEDICINE

## 2023-04-30 RX ADMIN — SODIUM CHLORIDE, PRESERVATIVE FREE 10 ML: 5 INJECTION INTRAVENOUS at 08:08

## 2023-04-30 RX ADMIN — PANTOPRAZOLE SODIUM 40 MG: 40 TABLET, DELAYED RELEASE ORAL at 07:56

## 2023-04-30 RX ADMIN — SODIUM CHLORIDE, PRESERVATIVE FREE 10 ML: 5 INJECTION INTRAVENOUS at 20:10

## 2023-04-30 RX ADMIN — IMMUNE GLOBULIN (HUMAN) 45 G: 10 INJECTION INTRAVENOUS; SUBCUTANEOUS at 16:46

## 2023-04-30 RX ADMIN — PREDNISONE 70 MG: 20 TABLET ORAL at 07:56

## 2023-04-30 ASSESSMENT — PAIN SCALES - GENERAL
PAINLEVEL_OUTOF10: 1
PAINLEVEL_OUTOF10: 0

## 2023-05-01 LAB
25(OH)D3 SERPL-MCNC: 12 NG/ML (ref 30–100)
ALBUMIN SERPL-MCNC: 2.9 G/DL (ref 3.5–5)
ALBUMIN/GLOB SERPL: 0.5 (ref 0.4–1.6)
ALP SERPL-CCNC: 34 U/L (ref 50–136)
ALT SERPL-CCNC: 17 U/L (ref 12–65)
ANION GAP SERPL CALC-SCNC: 1 MMOL/L (ref 2–11)
AST SERPL-CCNC: 10 U/L (ref 15–37)
BASOPHILS # BLD: 0 K/UL (ref 0–0.2)
BASOPHILS NFR BLD: 0 % (ref 0–2)
BILIRUB SERPL-MCNC: 0.5 MG/DL (ref 0.2–1.1)
BUN SERPL-MCNC: 14 MG/DL (ref 6–23)
CALCIUM SERPL-MCNC: 9.1 MG/DL (ref 8.3–10.4)
CHLORIDE SERPL-SCNC: 106 MMOL/L (ref 101–110)
CO2 SERPL-SCNC: 27 MMOL/L (ref 21–32)
CREAT SERPL-MCNC: 0.6 MG/DL (ref 0.6–1)
DIFFERENTIAL METHOD BLD: ABNORMAL
EOSINOPHIL # BLD: 0.1 K/UL (ref 0–0.8)
EOSINOPHIL NFR BLD: 1 % (ref 0.5–7.8)
ERYTHROCYTE [DISTWIDTH] IN BLOOD BY AUTOMATED COUNT: 13.4 % (ref 11.9–14.6)
GLOBULIN SER CALC-MCNC: 5.9 G/DL (ref 2.8–4.5)
GLUCOSE SERPL-MCNC: 65 MG/DL (ref 65–100)
HCT VFR BLD AUTO: 36.7 % (ref 35.8–46.3)
HGB BLD-MCNC: 12.4 G/DL (ref 11.7–15.4)
IMM GRANULOCYTES # BLD AUTO: 0.1 K/UL (ref 0–0.5)
IMM GRANULOCYTES NFR BLD AUTO: 1 % (ref 0–5)
LYMPHOCYTES # BLD: 4.2 K/UL (ref 0.5–4.6)
LYMPHOCYTES NFR BLD: 40 % (ref 13–44)
MCH RBC QN AUTO: 28.6 PG (ref 26.1–32.9)
MCHC RBC AUTO-ENTMCNC: 33.8 G/DL (ref 31.4–35)
MCV RBC AUTO: 84.8 FL (ref 82–102)
MONOCYTES # BLD: 0.8 K/UL (ref 0.1–1.3)
MONOCYTES NFR BLD: 7 % (ref 4–12)
NEUTS SEG # BLD: 5.5 K/UL (ref 1.7–8.2)
NEUTS SEG NFR BLD: 51 % (ref 43–78)
NRBC # BLD: 0 K/UL (ref 0–0.2)
PLATELET # BLD AUTO: 147 K/UL (ref 150–450)
PMV BLD AUTO: 11.7 FL (ref 9.4–12.3)
POTASSIUM SERPL-SCNC: 5 MMOL/L (ref 3.5–5.1)
PROT SERPL-MCNC: 8.8 G/DL (ref 6.3–8.2)
RBC # BLD AUTO: 4.33 M/UL (ref 4.05–5.2)
SODIUM SERPL-SCNC: 134 MMOL/L (ref 133–143)
WBC # BLD AUTO: 10.7 K/UL (ref 4.3–11.1)

## 2023-05-01 PROCEDURE — 1100000000 HC RM PRIVATE

## 2023-05-01 PROCEDURE — APPSS30 APP SPLIT SHARED TIME 16-30 MINUTES: Performed by: NURSE PRACTITIONER

## 2023-05-01 PROCEDURE — 2580000003 HC RX 258: Performed by: NURSE PRACTITIONER

## 2023-05-01 PROCEDURE — 85025 COMPLETE CBC W/AUTO DIFF WBC: CPT

## 2023-05-01 PROCEDURE — 6360000002 HC RX W HCPCS: Performed by: NURSE PRACTITIONER

## 2023-05-01 PROCEDURE — 99233 SBSQ HOSP IP/OBS HIGH 50: CPT | Performed by: INTERNAL MEDICINE

## 2023-05-01 PROCEDURE — 6370000000 HC RX 637 (ALT 250 FOR IP): Performed by: NURSE PRACTITIONER

## 2023-05-01 PROCEDURE — 82306 VITAMIN D 25 HYDROXY: CPT

## 2023-05-01 PROCEDURE — 36415 COLL VENOUS BLD VENIPUNCTURE: CPT

## 2023-05-01 PROCEDURE — 80053 COMPREHEN METABOLIC PANEL: CPT

## 2023-05-01 RX ORDER — ENOXAPARIN SODIUM 100 MG/ML
30 INJECTION SUBCUTANEOUS DAILY
Status: DISCONTINUED | OUTPATIENT
Start: 2023-05-01 | End: 2023-05-02 | Stop reason: HOSPADM

## 2023-05-01 RX ADMIN — PREDNISONE 70 MG: 20 TABLET ORAL at 09:04

## 2023-05-01 RX ADMIN — SODIUM CHLORIDE, PRESERVATIVE FREE 10 ML: 5 INJECTION INTRAVENOUS at 09:05

## 2023-05-01 RX ADMIN — PANTOPRAZOLE SODIUM 40 MG: 40 TABLET, DELAYED RELEASE ORAL at 09:04

## 2023-05-01 RX ADMIN — ENOXAPARIN SODIUM 30 MG: 100 INJECTION SUBCUTANEOUS at 16:06

## 2023-05-01 RX ADMIN — SODIUM CHLORIDE, PRESERVATIVE FREE 10 ML: 5 INJECTION INTRAVENOUS at 20:47

## 2023-05-01 ASSESSMENT — PAIN SCALES - GENERAL: PAINLEVEL_OUTOF10: 0

## 2023-05-01 NOTE — CARE COORDINATION
Case Management Assessment  Initial Evaluation    Date/Time of Evaluation: 5/1/2023 12:31 PM  Assessment Completed by: JANINA Bridges    If patient is discharged prior to next notation, then this note serves as note for discharge by case management. Patient Name: Emmy Harrington                   YOB: 2000  Diagnosis: Acute idiopathic thrombocytopenic purpura ([facility] [address] [D69.3]                   Date / Time: 4/28/2023  3:22 PM    Patient Admission Status: Inpatient   Readmission Risk (Low < 19, Mod (19-27), High > 27): Readmission Risk Score: 13.5    Current PCP: Harmony Zapata MD  PCP verified by CM? Yes (Avani Penaloza MD (new PCP))    Chart Reviewed: Yes      History Provided by: Patient, Medical Record  Patient Orientation: Alert and Oriented, Person, Self, Place    Patient Cognition: Alert    Hospitalization in the last 30 days (Readmission):  Yes    If yes, Readmission Assessment in CM Navigator will be completed. Advance Directives:      Code Status: Full Code   Patient's Primary Decision Maker is: Legal Next of Kin    Primary Decision Maker: Susie Guidry - Parent - [phone]    Secondary Decision Maker: Yulisa Victoria - Brother/Sister - [phone]    Discharge Planning:    Patient lives with: Family Members Type of Home: House  Primary Care Giver: Self  Patient Support Systems include: Parent, Family Members   Current Financial resources: Other (Comment) (BCBS)  Current community resources: None  Current services prior to admission: None            Current DME:              Type of Home Care services:  None    ADLS  Prior functional level: Independent in ADLs/IADLs  Current functional level: Independent in ADLs/IADLs    PT AM-PAC:   /24  OT AM-PAC:   /24    Family can provide assistance at DC: Yes  Would you like Case Management to discuss the discharge plan with any other family members/significant others, and if so, who?  No  Plans to Return to Present Housing: Yes  Other

## 2023-05-01 NOTE — PROGRESS NOTES
END OF SHIFT SUMMARY:    Significant vitals this shift:    Significant labs this shift:    Tests performed this shift:    Orders to be followed up on:    Blood products given this shift:    Additional events this shift:   No pain no fevers during shift     I/Os:  +/- this shift:   05/01 0701 - 05/01 1900  In: 720 [P.O.:720]  Out: -   Occurrences this Shift:  Urine yes, BM no, Emesis no    Silvana Cordero RN
of motion in general.  No edema and no tenderness. Psych Appropriate mood and affect. Labs:    Recent Results (from the past 24 hour(s))   CBC with Auto Differential    Collection Time: 05/01/23  6:18 AM   Result Value Ref Range    WBC 10.7 4.3 - 11.1 K/uL    RBC 4.33 4.05 - 5.2 M/uL    Hemoglobin 12.4 11.7 - 15.4 g/dL    Hematocrit 36.7 35.8 - 46.3 %    MCV 84.8 82 - 102 FL    MCH 28.6 26.1 - 32.9 PG    MCHC 33.8 31.4 - 35.0 g/dL    RDW 13.4 11.9 - 14.6 %    Platelets 439 (L) 594 - 450 K/uL    MPV 11.7 9.4 - 12.3 FL    nRBC 0.00 0.0 - 0.2 K/uL    Differential Type AUTOMATED      Seg Neutrophils 51 43 - 78 %    Lymphocytes 40 13 - 44 %    Monocytes 7 4.0 - 12.0 %    Eosinophils % 1 0.5 - 7.8 %    Basophils 0 0.0 - 2.0 %    Immature Granulocytes 1 0.0 - 5.0 %    Segs Absolute 5.5 1.7 - 8.2 K/UL    Absolute Lymph # 4.2 0.5 - 4.6 K/UL    Absolute Mono # 0.8 0.1 - 1.3 K/UL    Absolute Eos # 0.1 0.0 - 0.8 K/UL    Basophils Absolute 0.0 0.0 - 0.2 K/UL    Absolute Immature Granulocyte 0.1 0.0 - 0.5 K/UL       Imaging:  None     ASSESSMENT:  Patient Active Problem List   Diagnosis    Acute idiopathic thrombocytopenic purpura (HCC)    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 Danelle Muñoz is a 23 y.o. female admitted on April 28, 2023 with a primary diagnosis of acute idiopathic thrombocytopenic purpura. She has a history of ITP and follows with Dr. Melissa Preciado outpatient. She originally presented in early February with platelets of 8,178. During hospital course, she received IVIG and was started on prednisone 60 mg daily with rapid improvement. She completed her prednisone taper 3/26/2023. Plts were 75,000 on 3/27/23 an she was discharged back to her PCP. On 4/14/23 she called hematology office with complaints of red rashes on her body of 2-3 day duration and bleeding gums of 1 day duration.  CBC

## 2023-05-02 VITALS
DIASTOLIC BLOOD PRESSURE: 63 MMHG | SYSTOLIC BLOOD PRESSURE: 102 MMHG | WEIGHT: 102 LBS | OXYGEN SATURATION: 99 % | HEART RATE: 60 BPM | HEIGHT: 64 IN | TEMPERATURE: 99.2 F | RESPIRATION RATE: 18 BRPM | BODY MASS INDEX: 17.42 KG/M2

## 2023-05-02 LAB
BASOPHILS # BLD: 0 K/UL (ref 0–0.2)
BASOPHILS NFR BLD: 0 % (ref 0–2)
DIFFERENTIAL METHOD BLD: ABNORMAL
EOSINOPHIL # BLD: 0.1 K/UL (ref 0–0.8)
EOSINOPHIL NFR BLD: 1 % (ref 0.5–7.8)
ERYTHROCYTE [DISTWIDTH] IN BLOOD BY AUTOMATED COUNT: 13.5 % (ref 11.9–14.6)
HCT VFR BLD AUTO: 35.5 % (ref 35.8–46.3)
HGB BLD-MCNC: 12.2 G/DL (ref 11.7–15.4)
IMM GRANULOCYTES # BLD AUTO: 0.1 K/UL (ref 0–0.5)
IMM GRANULOCYTES NFR BLD AUTO: 1 % (ref 0–5)
LYMPHOCYTES # BLD: 3.9 K/UL (ref 0.5–4.6)
LYMPHOCYTES NFR BLD: 29 % (ref 13–44)
MCH RBC QN AUTO: 28.8 PG (ref 26.1–32.9)
MCHC RBC AUTO-ENTMCNC: 34.4 G/DL (ref 31.4–35)
MCV RBC AUTO: 83.7 FL (ref 82–102)
MONOCYTES # BLD: 0.9 K/UL (ref 0.1–1.3)
MONOCYTES NFR BLD: 6 % (ref 4–12)
NEUTS SEG # BLD: 8.6 K/UL (ref 1.7–8.2)
NEUTS SEG NFR BLD: 63 % (ref 43–78)
NRBC # BLD: 0 K/UL (ref 0–0.2)
PLATELET # BLD AUTO: 213 K/UL (ref 150–450)
PMV BLD AUTO: 11.5 FL (ref 9.4–12.3)
RBC # BLD AUTO: 4.24 M/UL (ref 4.05–5.2)
WBC # BLD AUTO: 13.5 K/UL (ref 4.3–11.1)

## 2023-05-02 PROCEDURE — 85025 COMPLETE CBC W/AUTO DIFF WBC: CPT

## 2023-05-02 PROCEDURE — 36415 COLL VENOUS BLD VENIPUNCTURE: CPT

## 2023-05-02 PROCEDURE — 6360000002 HC RX W HCPCS: Performed by: NURSE PRACTITIONER

## 2023-05-02 PROCEDURE — 2580000003 HC RX 258: Performed by: NURSE PRACTITIONER

## 2023-05-02 PROCEDURE — 6370000000 HC RX 637 (ALT 250 FOR IP): Performed by: NURSE PRACTITIONER

## 2023-05-02 PROCEDURE — 99239 HOSP IP/OBS DSCHRG MGMT >30: CPT | Performed by: INTERNAL MEDICINE

## 2023-05-02 PROCEDURE — APPSS45 APP SPLIT SHARED TIME 31-45 MINUTES: Performed by: NURSE PRACTITIONER

## 2023-05-02 RX ORDER — ERGOCALCIFEROL 1.25 MG/1
50000 CAPSULE ORAL DAILY
Status: DISCONTINUED | OUTPATIENT
Start: 2023-05-02 | End: 2023-05-02 | Stop reason: HOSPADM

## 2023-05-02 RX ORDER — PREDNISONE 10 MG/1
30 TABLET ORAL DAILY
Qty: 90 TABLET | Refills: 0 | Status: SHIPPED | OUTPATIENT
Start: 2023-05-02 | End: 2023-06-01

## 2023-05-02 RX ORDER — ERGOCALCIFEROL 1.25 MG/1
50000 CAPSULE ORAL DAILY
Qty: 5 CAPSULE | Refills: 0 | Status: SHIPPED | OUTPATIENT
Start: 2023-05-03 | End: 2023-05-07

## 2023-05-02 RX ADMIN — PREDNISONE 70 MG: 20 TABLET ORAL at 08:34

## 2023-05-02 RX ADMIN — ERGOCALCIFEROL 50000 UNITS: 1.25 CAPSULE ORAL at 08:34

## 2023-05-02 RX ADMIN — SODIUM CHLORIDE, PRESERVATIVE FREE 10 ML: 5 INJECTION INTRAVENOUS at 08:35

## 2023-05-02 RX ADMIN — IMMUNE GLOBULIN (HUMAN) 45 G: 10 INJECTION INTRAVENOUS; SUBCUTANEOUS at 14:48

## 2023-05-02 RX ADMIN — PANTOPRAZOLE SODIUM 40 MG: 40 TABLET, DELAYED RELEASE ORAL at 08:34

## 2023-05-02 NOTE — PLAN OF CARE
Problem: Safety - Adult  Goal: Free from fall injury  5/2/2023 1628 by Susan Banerjee RN  Outcome: Adequate for Discharge  5/2/2023 1135 by Susan Banerjee RN  Outcome: Progressing     Problem: Discharge Planning  Goal: Discharge to home or other facility with appropriate resources  5/2/2023 1628 by Susan Banerjee RN  Outcome: Adequate for Discharge  5/2/2023 1135 by Susan Banerjee RN  Outcome: Progressing  Flowsheets (Taken 5/2/2023 0804)  Discharge to home or other facility with appropriate resources: Identify barriers to discharge with patient and caregiver

## 2023-05-02 NOTE — CARE COORDINATION
Pt to d/c home today. Family will provide transportation. There were no PT/OT consults ordered during this hospitalization. Pt is independent with ADLs at baseline. No supportive care needs identified. Pt to f/u OP with PCP and oncology. Pt agrees with d/c plan. Milestones met. LOS = 4 days       05/01/23 1213   Service Assessment   Patient Orientation Alert and Oriented;Person;Self;Place   Cognition Alert   History Provided By Patient;Medical Record   Primary Caregiver Self   Support Systems Parent; Family Members   Patient's Denia [address] is: Legal Next of Steffanie [address]   PCP Verified by CM Yes  (Alfonzo Ambriz MD (new PCP))   Last Visit to PCP   (New PCP - first appointment in May 2023)   Prior Functional Level Independent in ADLs/IADLs   Current Functional Level Independent in ADLs/IADLs   Can patient return to prior living arrangement Yes   Ability to make needs known: Good   Family able to assist with home care needs: Yes   Would you like for me to discuss the discharge plan with any other family members/significant others, and if so, who? No   Financial Resources Other (Comment)  (BCBS)   Community Resources None   Social/Functional History   Lives With Family   Type of [address] [place] [address] [facility] None   Receives Help From [employer] [address]   Ambulation Assistance Independent   Transfer Assistance Independent   Active  Yes   Mode of Transportation Car   Occupation Part time employment   Type of Occupation [employer]   Discharge Planning   [facility]   Current Services Prior To Admission None   Potential Assistance Needed N/A   DME Ordered?  No   Potential Assistance Purchasing Medications No   Type of Home Care Services None   Patient expects to be discharged to: House   One/Two Story Residence

## 2023-05-17 DIAGNOSIS — D69.3 ITP SECONDARY TO INFECTION (HCC): Primary | ICD-10-CM

## 2023-05-18 ENCOUNTER — HOSPITAL ENCOUNTER (OUTPATIENT)
Dept: INFUSION THERAPY | Age: 23
Discharge: HOME | End: 2023-05-18

## 2023-05-18 ENCOUNTER — HOSPITAL ENCOUNTER (OUTPATIENT)
Dept: LAB | Age: 23
Discharge: HOME | End: 2023-05-18
Payer: COMMERCIAL

## 2023-05-18 ENCOUNTER — OFFICE VISIT (OUTPATIENT)
Dept: ONCOLOGY | Age: 23
End: 2023-05-18
Payer: COMMERCIAL

## 2023-05-18 VITALS
DIASTOLIC BLOOD PRESSURE: 79 MMHG | OXYGEN SATURATION: 100 % | WEIGHT: 101.5 LBS | BODY MASS INDEX: 17.33 KG/M2 | SYSTOLIC BLOOD PRESSURE: 113 MMHG | TEMPERATURE: 98.1 F | RESPIRATION RATE: 16 BRPM | HEART RATE: 75 BPM | HEIGHT: 64 IN

## 2023-05-18 DIAGNOSIS — Z86.2 HX OF IDIOPATHIC THROMBOCYTOPENIC PURPURA: ICD-10-CM

## 2023-05-18 DIAGNOSIS — D69.3 ITP SECONDARY TO INFECTION (HCC): Primary | ICD-10-CM

## 2023-05-18 DIAGNOSIS — D69.3 ITP SECONDARY TO INFECTION (HCC): ICD-10-CM

## 2023-05-18 LAB
ALBUMIN SERPL-MCNC: 3.7 G/DL (ref 3.5–5)
ALBUMIN/GLOB SERPL: 0.7 (ref 0.4–1.6)
ALP SERPL-CCNC: 41 U/L (ref 50–136)
ALT SERPL-CCNC: 18 U/L (ref 12–65)
ANION GAP SERPL CALC-SCNC: 4 MMOL/L (ref 2–11)
AST SERPL-CCNC: 13 U/L (ref 15–37)
BASOPHILS # BLD: 0 K/UL (ref 0–0.2)
BASOPHILS NFR BLD: 0 % (ref 0–2)
BILIRUB SERPL-MCNC: 0.9 MG/DL (ref 0.2–1.1)
BUN SERPL-MCNC: 12 MG/DL (ref 6–23)
CALCIUM SERPL-MCNC: 9.2 MG/DL (ref 8.3–10.4)
CHLORIDE SERPL-SCNC: 104 MMOL/L (ref 101–110)
CO2 SERPL-SCNC: 29 MMOL/L (ref 21–32)
CREAT SERPL-MCNC: 0.8 MG/DL (ref 0.6–1)
DIFFERENTIAL METHOD BLD: ABNORMAL
EOSINOPHIL # BLD: 0 K/UL (ref 0–0.8)
EOSINOPHIL NFR BLD: 0 % (ref 0.5–7.8)
ERYTHROCYTE [DISTWIDTH] IN BLOOD BY AUTOMATED COUNT: 16 % (ref 11.9–14.6)
GLOBULIN SER CALC-MCNC: 5.4 G/DL (ref 2.8–4.5)
GLUCOSE SERPL-MCNC: 94 MG/DL (ref 65–100)
HCT VFR BLD AUTO: 39 % (ref 35.8–46.3)
HGB BLD-MCNC: 13.1 G/DL (ref 11.7–15.4)
IMM GRANULOCYTES # BLD AUTO: 0 K/UL (ref 0–0.5)
IMM GRANULOCYTES NFR BLD AUTO: 0 % (ref 0–5)
LYMPHOCYTES # BLD: 1.7 K/UL (ref 0.5–4.6)
LYMPHOCYTES NFR BLD: 26 % (ref 13–44)
MCH RBC QN AUTO: 29.4 PG (ref 26.1–32.9)
MCHC RBC AUTO-ENTMCNC: 33.6 G/DL (ref 31.4–35)
MCV RBC AUTO: 87.6 FL (ref 82–102)
MONOCYTES # BLD: 0.4 K/UL (ref 0.1–1.3)
MONOCYTES NFR BLD: 6 % (ref 4–12)
NEUTS SEG # BLD: 4.3 K/UL (ref 1.7–8.2)
NEUTS SEG NFR BLD: 67 % (ref 43–78)
NRBC # BLD: 0 K/UL (ref 0–0.2)
PLATELET # BLD AUTO: 120 K/UL (ref 150–450)
PMV BLD AUTO: 12.1 FL (ref 9.4–12.3)
POTASSIUM SERPL-SCNC: 4.3 MMOL/L (ref 3.5–5.1)
PROT SERPL-MCNC: 9.1 G/DL (ref 6.3–8.2)
RBC # BLD AUTO: 4.45 M/UL (ref 4.05–5.2)
SODIUM SERPL-SCNC: 137 MMOL/L (ref 133–143)
WBC # BLD AUTO: 6.4 K/UL (ref 4.3–11.1)

## 2023-05-18 PROCEDURE — 85025 COMPLETE CBC W/AUTO DIFF WBC: CPT

## 2023-05-18 PROCEDURE — 36415 COLL VENOUS BLD VENIPUNCTURE: CPT

## 2023-05-18 PROCEDURE — 99214 OFFICE O/P EST MOD 30 MIN: CPT | Performed by: INTERNAL MEDICINE

## 2023-05-18 PROCEDURE — 80053 COMPREHEN METABOLIC PANEL: CPT

## 2023-05-18 ASSESSMENT — PATIENT HEALTH QUESTIONNAIRE - PHQ9
SUM OF ALL RESPONSES TO PHQ QUESTIONS 1-9: 0
SUM OF ALL RESPONSES TO PHQ QUESTIONS 1-9: 0
1. LITTLE INTEREST OR PLEASURE IN DOING THINGS: 0
SUM OF ALL RESPONSES TO PHQ QUESTIONS 1-9: 0
SUM OF ALL RESPONSES TO PHQ QUESTIONS 1-9: 0
SUM OF ALL RESPONSES TO PHQ9 QUESTIONS 1 & 2: 0
2. FEELING DOWN, DEPRESSED OR HOPELESS: 0

## 2023-05-18 NOTE — PROGRESS NOTES
[facility] Hematology & Oncology: Office Visit Progress Note    Chief Complaint:    ITP    History of Present Illness:  23 y.o. female without significant past medical history presented to the emergency room complaining of epistaxes and rash. Patient reports developing rashes on both legs over the past 2 days, having been having intermittent nosebleeds as well. She was recently seen in the ER on 2/4/2023 with abdominal pain and diarrhea, diagnosed with a viral gastroenteritis at that time which did not require admission. She reports some residue abdominal pain but there has been improvement over the past week. ED work-up showed WBC 6.5, hemoglobin 12.3 down to 11.5, platelet 2. Abdominal imaging was unremarkable. She is iron deficient with transferrin saturation 15%. PTT elevated at 75, normal INR/PTT/fibrinogen level. SPEP pending. Hematology is consulted. Interim history update in A/P. Review of Systems:  Constitutional Denies fever or chills. Denies weight loss or appetite changes. Denies fatigue. Denies anorexia. HEENT Denies trauma, bluring vision, hearing loss, ear pain, nosebleeds, sore throat, neck pain and ear discharge. Skin Denies lesions or rashes. Lungs Denies shortness of breath, cough, sputum production or hemoptysis. Cardiovascular Denies chest pain, palpitations, orthopnea, claudication and leg swelling. Gastrointestinal Denies nausea, vomiting, bowel changes. Denies bloody or black stools. Denies abdominal pain.  Denies dysuria, frequency or hesitancy of urination   Neuro Denies headaches, visual changes or ataxia. Denies dizziness, tingling, tremors, sensory change, speech change, focal weakness and headaches. Hematology Denies nasal/gum bleeding, denies easy bruise   Endo Denies heat/cold intolerance, denies diabetes. MSK Denies back pain, swollen legs, myalgias and falls. Psychiatric/Behavioral Denies depression and substance abuse.  The patient is not

## 2023-05-18 NOTE — PATIENT INSTRUCTIONS
Patient Instructions from Today's Visit    Reason for Visit:  Follow up    Plan:  -Will do urgent referral to [facility] since they are in network  -Continue prednisone 30mg daily  -Will refer to financial counseling    Follow Up:  As needed    Recent Lab Results:  Hospital Outpatient Visit on 05/18/2023   Component Date Value Ref Range Status    WBC 05/18/2023 6.4  4.3 - 11.1 K/uL Final    RESULTS CHECKED X 2    RBC 05/18/2023 4.45  4.05 - 5.2 M/uL Final    Hemoglobin 05/18/2023 13.1  11.7 - 15.4 g/dL Final    Hematocrit 05/18/2023 39.0  35.8 - 46.3 % Final    MCV 05/18/2023 87.6  82.0 - 102.0 FL Final    MCH 05/18/2023 29.4  26.1 - 32.9 PG Final    MCHC 05/18/2023 33.6  31.4 - 35.0 g/dL Final    RDW 05/18/2023 16.0 (H)  11.9 - 14.6 % Final    Platelets 79/68/3987 120 (L)  150 - 450 K/uL Final    MPV 05/18/2023 12.1  9.4 - 12.3 FL Final    nRBC 05/18/2023 0.00  0.0 - 0.2 K/uL Final    **Note: Absolute NRBC parameter is now reported with Hemogram**    Differential Type 05/18/2023 AUTOMATED    Final    Neutrophils % 05/18/2023 67  43 - 78 % Final    Lymphocytes % 05/18/2023 26  13 - 44 % Final    Monocytes % 05/18/2023 6  4.0 - 12.0 % Final    Eosinophils % 05/18/2023 0 (L)  0.5 - 7.8 % Final    Basophils % 05/18/2023 0  0.0 - 2.0 % Final    Immature Granulocytes 05/18/2023 0  0.0 - 5.0 % Final    Neutrophils Absolute 05/18/2023 4.3  1.7 - 8.2 K/UL Final    Lymphocytes Absolute 05/18/2023 1.7  0.5 - 4.6 K/UL Final    Monocytes Absolute 05/18/2023 0.4  0.1 - 1.3 K/UL Final    Eosinophils Absolute 05/18/2023 0.0  0.0 - 0.8 K/UL Final    Basophils Absolute 05/18/2023 0.0  0.0 - 0.2 K/UL Final    Absolute Immature Granulocyte 05/18/2023 0.0  0.0 - 0.5 K/UL Final    Sodium 05/18/2023 137  133 - 143 mmol/L Final    Potassium 05/18/2023 4.3  3.5 - 5.1 mmol/L Final    Chloride 05/18/2023 104  101 - 110 mmol/L Final    CO2 05/18/2023 29  21 - 32 mmol/L Final    Anion Gap 05/18/2023 4  2 - 11 mmol/L Final    Glucose 05/18/2023

## 2023-06-02 ENCOUNTER — PATIENT MESSAGE (OUTPATIENT)
Dept: ONCOLOGY | Age: 23
End: 2023-06-02

## 2023-06-02 RX ORDER — PREDNISONE 10 MG/1
30 TABLET ORAL DAILY
Qty: 90 TABLET | Refills: 0 | Status: SHIPPED | OUTPATIENT
Start: 2023-06-02 | End: 2023-07-02

## 2023-06-02 NOTE — TELEPHONE ENCOUNTER
Patient has appointment in [place] on June 22nd for ITP. Refill for prednisone 30mg sent and patient aware once she transfers care to [place] they will refill her medication. Patient verbalized understanding.

## 2024-07-25 NOTE — PROGRESS NOTES
Discharge instructions were reviewed with the patient. Opportunity for questions given. Patient verbalized understanding of discharge and follow up instructions, as well as S/S to report to MD or return to ER for. PIV was removed. Prescriptions provided. Patient will D/C to home. [Post ER] : a post ER visit [Family Member] : family member [FreeTextEntry1] : [facility] after mechanical trip and fall